# Patient Record
Sex: MALE | Race: WHITE | NOT HISPANIC OR LATINO | Employment: FULL TIME | URBAN - METROPOLITAN AREA
[De-identification: names, ages, dates, MRNs, and addresses within clinical notes are randomized per-mention and may not be internally consistent; named-entity substitution may affect disease eponyms.]

---

## 2019-08-15 LAB
ALBUMIN SERPL-MCNC: 4 GM/DL (ref 3.4–5)
ALBUMIN/GLOB SERPL: 1.3 RATIO (ref 1.1–2)
ALP SERPL-CCNC: 120 UNIT/L (ref 50–136)
ALT SERPL-CCNC: 36 UNIT/L (ref 12–78)
AST SERPL-CCNC: 18 UNIT/L (ref 15–37)
BILIRUB SERPL-MCNC: 0.8 MG/DL (ref 0.2–1)
BILIRUBIN DIRECT+TOT PNL SERPL-MCNC: 0.1 MG/DL (ref 0–0.5)
BILIRUBIN DIRECT+TOT PNL SERPL-MCNC: 0.7 MG/DL (ref 0–0.8)
BUN SERPL-MCNC: 14 MG/DL (ref 7–18)
CALCIUM SERPL-MCNC: 9.4 MG/DL (ref 8.5–10.1)
CHLORIDE SERPL-SCNC: 106 MMOL/L (ref 98–107)
CO2 SERPL-SCNC: 31 MMOL/L (ref 21–32)
CREAT SERPL-MCNC: 1.03 MG/DL (ref 0.7–1.3)
ERYTHROCYTE [DISTWIDTH] IN BLOOD BY AUTOMATED COUNT: 12.5 % (ref 11.5–17)
GLOBULIN SER-MCNC: 3.1 GM/DL (ref 2.4–3.5)
GLUCOSE SERPL-MCNC: 90 MG/DL (ref 74–106)
HCT VFR BLD AUTO: 49.5 % (ref 42–52)
HGB BLD-MCNC: 16.4 GM/DL (ref 14–18)
MCH RBC QN AUTO: 30.1 PG (ref 27–31)
MCHC RBC AUTO-ENTMCNC: 33.1 GM/DL (ref 33–36)
MCV RBC AUTO: 90.8 FL (ref 80–94)
PLATELET # BLD AUTO: 198 X10(3)/MCL (ref 130–400)
PMV BLD AUTO: 9.4 FL (ref 9.4–12.4)
POTASSIUM SERPL-SCNC: 4.5 MMOL/L (ref 3.5–5.1)
PROT SERPL-MCNC: 7.1 GM/DL (ref 6.4–8.2)
RBC # BLD AUTO: 5.45 X10(6)/MCL (ref 4.7–6.1)
SODIUM SERPL-SCNC: 142 MMOL/L (ref 136–145)
WBC # SPEC AUTO: 5.4 X10(3)/MCL (ref 4.5–11.5)

## 2019-09-03 ENCOUNTER — HOSPITAL ENCOUNTER (OUTPATIENT)
Dept: MEDSURG UNIT | Facility: HOSPITAL | Age: 59
End: 2019-09-05
Attending: SPECIALIST | Admitting: SPECIALIST

## 2019-09-03 LAB
ABS NEUT (OLG): 8.02 X10(3)/MCL (ref 2.1–9.2)
ALBUMIN SERPL-MCNC: 3.2 GM/DL (ref 3.4–5)
ALBUMIN/GLOB SERPL: 1.5 RATIO (ref 1.1–2)
ALP SERPL-CCNC: 72 UNIT/L (ref 50–136)
ALT SERPL-CCNC: 24 UNIT/L (ref 12–78)
AST SERPL-CCNC: 21 UNIT/L (ref 15–37)
BASOPHILS # BLD AUTO: 0 X10(3)/MCL (ref 0–0.2)
BASOPHILS NFR BLD AUTO: 0 %
BILIRUB SERPL-MCNC: 0.8 MG/DL (ref 0.2–1)
BILIRUBIN DIRECT+TOT PNL SERPL-MCNC: 0.2 MG/DL (ref 0–0.5)
BILIRUBIN DIRECT+TOT PNL SERPL-MCNC: 0.6 MG/DL (ref 0–0.8)
BUN SERPL-MCNC: 11 MG/DL (ref 7–18)
CALCIUM SERPL-MCNC: 7 MG/DL (ref 8.5–10.1)
CHLORIDE SERPL-SCNC: 109 MMOL/L (ref 98–107)
CO2 SERPL-SCNC: 24 MMOL/L (ref 21–32)
CREAT SERPL-MCNC: 0.96 MG/DL (ref 0.7–1.3)
ERYTHROCYTE [DISTWIDTH] IN BLOOD BY AUTOMATED COUNT: 12.4 % (ref 11.5–17)
GLOBULIN SER-MCNC: 2.2 GM/DL (ref 2.4–3.5)
GLUCOSE SERPL-MCNC: 161 MG/DL (ref 74–106)
GROUP & RH: NORMAL
HCT VFR BLD AUTO: 39.7 % (ref 42–52)
HGB BLD-MCNC: 13.2 GM/DL (ref 14–18)
LYMPHOCYTES # BLD AUTO: 1.2 X10(3)/MCL (ref 0.6–4.6)
LYMPHOCYTES NFR BLD AUTO: 12 %
MCH RBC QN AUTO: 30.7 PG (ref 27–31)
MCHC RBC AUTO-ENTMCNC: 33.2 GM/DL (ref 33–36)
MCV RBC AUTO: 92.3 FL (ref 80–94)
MONOCYTES # BLD AUTO: 0.2 X10(3)/MCL (ref 0.1–1.3)
MONOCYTES NFR BLD AUTO: 2 %
NEUTROPHILS # BLD AUTO: 8.02 X10(3)/MCL (ref 2.1–9.2)
NEUTROPHILS NFR BLD AUTO: 84 %
PLATELET # BLD AUTO: 146 X10(3)/MCL (ref 130–400)
PMV BLD AUTO: 9.5 FL (ref 9.4–12.4)
POTASSIUM SERPL-SCNC: 3.7 MMOL/L (ref 3.5–5.1)
PROT SERPL-MCNC: 5.4 GM/DL (ref 6.4–8.2)
RBC # BLD AUTO: 4.3 X10(6)/MCL (ref 4.7–6.1)
SODIUM SERPL-SCNC: 142 MMOL/L (ref 136–145)
WBC # SPEC AUTO: 9.5 X10(3)/MCL (ref 4.5–11.5)

## 2019-09-04 LAB
ABS NEUT (OLG): 7.22 X10(3)/MCL (ref 2.1–9.2)
ALBUMIN SERPL-MCNC: 3.5 GM/DL (ref 3.4–5)
ALBUMIN/GLOB SERPL: 1.7 {RATIO}
ALP SERPL-CCNC: 78 UNIT/L (ref 50–136)
ALT SERPL-CCNC: 35 UNIT/L (ref 12–78)
AST SERPL-CCNC: 19 UNIT/L (ref 15–37)
BASOPHILS # BLD AUTO: 0 X10(3)/MCL (ref 0–0.2)
BASOPHILS NFR BLD AUTO: 0 %
BILIRUB SERPL-MCNC: 0.8 MG/DL (ref 0.2–1)
BILIRUBIN DIRECT+TOT PNL SERPL-MCNC: 0.2 MG/DL (ref 0–0.2)
BILIRUBIN DIRECT+TOT PNL SERPL-MCNC: 0.6 MG/DL (ref 0–0.8)
BUN SERPL-MCNC: 11 MG/DL (ref 7–18)
CALCIUM SERPL-MCNC: 8.6 MG/DL (ref 8.5–10.1)
CHLORIDE SERPL-SCNC: 106 MMOL/L (ref 98–107)
CO2 SERPL-SCNC: 29 MMOL/L (ref 21–32)
CREAT FLD-MCNC: 0.8 MG/DL
CREAT SERPL-MCNC: 1.17 MG/DL (ref 0.7–1.3)
EOSINOPHIL # BLD AUTO: 0 X10(3)/MCL (ref 0–0.9)
EOSINOPHIL NFR BLD AUTO: 0 %
ERYTHROCYTE [DISTWIDTH] IN BLOOD BY AUTOMATED COUNT: 12.7 % (ref 11.5–17)
GLOBULIN SER-MCNC: 2.1 GM/DL (ref 2.4–3.5)
GLUCOSE SERPL-MCNC: 103 MG/DL (ref 74–106)
HCT VFR BLD AUTO: 42.6 % (ref 42–52)
HGB BLD-MCNC: 14 GM/DL (ref 14–18)
LYMPHOCYTES # BLD AUTO: 2 X10(3)/MCL (ref 0.6–4.6)
LYMPHOCYTES NFR BLD AUTO: 20 %
MCH RBC QN AUTO: 30.6 PG (ref 27–31)
MCHC RBC AUTO-ENTMCNC: 32.9 GM/DL (ref 33–36)
MCV RBC AUTO: 93.2 FL (ref 80–94)
MONOCYTES # BLD AUTO: 0.4 X10(3)/MCL (ref 0.1–1.3)
MONOCYTES NFR BLD AUTO: 4 %
NEUTROPHILS # BLD AUTO: 7.22 X10(3)/MCL (ref 2.1–9.2)
NEUTROPHILS NFR BLD AUTO: 74 %
PLATELET # BLD AUTO: 150 X10(3)/MCL (ref 130–400)
PMV BLD AUTO: 9.7 FL (ref 9.4–12.4)
POTASSIUM SERPL-SCNC: 3.9 MMOL/L (ref 3.5–5.1)
PROT SERPL-MCNC: 5.6 GM/DL (ref 6.4–8.2)
RBC # BLD AUTO: 4.57 X10(6)/MCL (ref 4.7–6.1)
SODIUM SERPL-SCNC: 140 MMOL/L (ref 136–145)
WBC # SPEC AUTO: 9.7 X10(3)/MCL (ref 4.5–11.5)

## 2022-04-30 NOTE — OP NOTE
DATE OF SURGERY:        SURGEON:  Owen Vergara MD  ASSISTANT:  Henrry Blake MD    PREOPERATIVE DIAGNOSIS:  Adenocarcinoma of the prostate.    POSTOPERATIVE DIAGNOSIS:  Adenocarcinoma of the prostate.    PROCEDURE:  Robot-assisted radical prostatectomy with bilateral pelvic lymph node dissection.    OPERATION IN DETAIL:  After preop consent, the patient was taken to the procedure room and placed in the supine position.  The patient had a prior open cholecystectomy.  We, therefore, placed a Veress needle lateral to the umbilicus and insufflated the abdomen with that.  Once this was done, an incision was made and a laparoscopic port was placed.  Under vision, all ports were placed.  We were pleased to see minimal adhesions around the site of the umbilicus and working ports.  Working ports were placed on the right and left (2 on the right, 3 on the left).  A visual port was placed above the umbilicus.  The robot was docked.  The patient was placed in steep Trendelenburg.  The bowel was packed away using the robot.  An incision was made above the seminal vesicles and vas deferens.  Dissection was carried forth on the right and left sides, fully mobilizing the right vas deferens and the right seminal vesicles.  Holding them on traction, we were able to mobilize the contralateral side.  Once this was done, the plane between the rectum and prostate was developed using a combination of sharp dissection with Bovie Endo cautery.  Once this was done to satisfaction, the umbilical ligaments were identified, and incised on the right and left.  The bladder was taken down from the top toward the pelvis, once the space of Retzius was entered.  This was performed sharply with Bovie Endo cautery for hemostasis.  At the deep pelvis, we were able to mobilize the periprosthetic fat and visualize the endopelvic fascia on the right and left sides.  With the bladder on traction, we opened the right endopelvic fascia, then  the left and dissected toward the apex of the prostate on both sides.  Once this was done, we noted some bleeding near the dorsal vein.  This was oversewn using a 3-0 Vicryl.  Bleeding ceased once this was placed.  An endovascular stapling device was used to incise the dorsal vein.  This allowed us to fully visualize the urethra.  Hemostasis was perfect.  We then held the bladder on traction, opened the anterior and posterior leaflets of the bladder.  Once the posterior leaflet of the bladder was incised, we dissected toward Denonvilliers fascia, identified seminal vesicles and vas deferens and, with effort, pulled them through the posterior leaflet of the bladder.  While on traction, we mobilized the prostatic vessels on the right and left toward the apex (using the EnSeal device).  Once this was done, combination sharp dissection with Bovie Endo cautery was used to fully mobilize the prostate and remove it.  After this portion of the procedure, we performed our laparoscopic node dissection.  This was performed on the right and then left sides with great care to avoid the obturator nerve and femoral vessels.  This was done without incident.  The right piotr packet was removed, then the left piotr packet was removed, as the procedure was performed similarly.  Once this was done, we performed a Mirza stitch to approximate the bladder and urethra.  Then, we performed an urethrovesical anastomosis with double-armed stitch, starting posteriorly, then finishing anteriorly, and tying.  A catheter was inserted.  We checked the anastomosis for water tightness and were pleased to see no extravasation of fluid.  The periprosthetic fat, lymphatic tissue, and prostate were then bagged.  AmnioFix was placed around the nerves on the right and the left sides.  Hemostatic improving gel was placed to assist.  The prostate was removed through the visual port.  A drain was placed in the most dependent portion of the pelvis.  Skin  edges were closed.  A good cosmetic result was noted.  The patient tolerated the procedure favorably and was transferred from the procedure room to recovery in stable condition.        ______________________________  Owen Vergara MD    SHS/SZ  DD:  09/03/2019  Time:  12:17PM  DT:  09/03/2019  Time:  12:38PM  Job #:  [id number]

## 2022-04-30 NOTE — DISCHARGE SUMMARY
DISCHARGE DATE:  09/03/2019    HISTORY OF PRESENT ILLNESS:  The patient is a 58-year-old gentleman with newly diagnosed Rachael 7 prostate cancer.  He had high volume disease, high PSA (7 cores present.  Gland measured 35.5 g).  The patient had all options discussed in great detail, seen multiple consultants and opted to have a radical prostatectomy with bilateral pelvic lymph node dissection (robotic-assist) with the knowledge that he may require radiation and hormonal therapy afterwards.  Risks and benefits were clearly outlined.  The patient understood and wished to proceed.    PAST MEDICAL HISTORY:  Prostate cancer, elevated PSA.    SURGICAL HISTORY:  Cholecystectomy, tonsillectomy.    PHYSICAL EXAM:  VITAL SIGNS:  Afebrile.  Vital signs stable.   GENERAL:  Comfortable, no acute distress.     ABDOMEN:  Soft, nontender and nondistended.  Flanks nontender.   GENITALIA:  Deferred.    HOSPITAL COURSE:  The patient was taken to the procedure room on 09/03/2019.  Uneventful robot-assisted radical prostatectomy was performed.  The patient tolerated the procedure favorably and was transferred from the procedure room to Recovery in stable condition.  On postoperative day 1, patient was ambulatory, had bowel sounds.  His diet was started.  He had a fair amount of fluid from the QUINTIN as the day progressed.  We therefore opted to maintain hospitalized status and send his QUINTIN fluid for creatinine.  We were pleased to see that the fluid was consistent with serum, and therefore on postop day 2, since he continued to do well, looked and felt great and was eager for discharge, we opted to send him home after leg bag training.  He will follow up in our office in approximately 10 days for a voiding trial.  He was given     a prescription for Percocet and Colace as well as antibiotics and advised to call with any problems in the interim.      ______________________________  Owen Vergara MD    [facility]  DD:  09/05/2019   Time:  06:58AM  DT:  09/05/2019  Time:  07:10AM  Job #:  [id number]

## 2023-09-14 DIAGNOSIS — D69.6 THROMBOCYTOPENIC DISORDER: Primary | ICD-10-CM

## 2023-09-25 NOTE — PROGRESS NOTES
Subjective:        PATIENT: Jose Sarmiento  MRN: [id number]  DATE: 9/26/2023  Chief Complaint: Follow-up (New patient visit for thrombocytopenic disorder)        09/26/2023  Dr. Chun Collins  Reason for referral thrombocytopenia: Referred on 09/05/2023    This is a 62-year-old male who his followed by [facility] Dr. Mcgrath for prostate carcinoma.  His prostate surgery was not September 3rd night 2019.  He  had radiation therapy [facility] 10/15/2022   His PSA was 42 it [place] 7.  A T2 C in 7 cores,    He was seen by his primary care had his normal health examination, he is also noted to have a left inguinal hernia pertinent laboratory review was noted to have a white count 3.6 hematocrit of 43 and a platelet count of 110.    Previous CBC showed a platelet count of 115 several months ago and in June of 2022 was normal at 149        He reports his newest medication is his cholesterol medicine.  Denies any history of hepatitis yellow jaundice tuberculosis.  He did have a tetanus shot and a flu shot.  And he is also had a upper respiratory illness treated on some steroids and antibiotics with a sore throat over the last 6-7 weeks.    This is recovering.    Denies any night sweats or weight loss no lymphadenopathy.    He does have some fatigue.  He denies any change in bruising or bleeding.    He denies any supplement use.  He does have a hernia however he is also trying to take care of his dad who is in terminal situation.          Past Medical History:   Diagnosis Date    HLD (hyperlipidemia)     Prostate cancer       .  Past Surgical History:   Procedure Laterality Date    CHOLECYSTECTOMY      COLONOSCOPY  08/20/2019    LAPAROSCOPY      PROSTATE BIOPSY      PROSTATECTOMY      TONSILLECTOMY        .  Family History   Problem Relation Age of Onset    Hypertension Father     Hyperlipidemia Father     Hypertension Sister     Cancer Brother         Prostate    Hypertension Brother       Social History     Socioeconomic  History    Marital status:    Tobacco Use    Smoking status: Never    Smokeless tobacco: Never   Substance and Sexual Activity    Alcohol use: Yes     Comment: Social drinker      .Review of patient's allergies indicates:  No Known Allergies     Current Outpatient Medications:     cholecalciferol, vitamin D3, 125 mcg (5,000 unit) Tab, Take 1 tablet by mouth once daily., Disp: , Rfl:     cyanocobalamin (VITAMIN B-12) 1000 MCG tablet, Take 100 mcg by mouth once daily., Disp: , Rfl:     doxycycline (VIBRAMYCIN) 100 MG Cap, Take 100 mg by mouth 2 (two) times daily., Disp: , Rfl:     folic acid/multivit-min/lutein (CENTRUM SILVER ORAL), Take by mouth., Disp: , Rfl:     predniSONE (DELTASONE) 20 MG tablet, Take 20 mg by mouth 2 (two) times daily., Disp: , Rfl:     promethazine-dextromethorphan (PROMETHAZINE-DM) 6.25-15 mg/5 mL Syrp, Take 5 mLs by mouth every 6 (six) hours as needed., Disp: , Rfl:     rosuvastatin (CRESTOR) 20 MG tablet, Take 20 mg by mouth., Disp: , Rfl:    Review of Systems     See HPI  Objective:     Vitals:    09/26/23 1329   BP: 96/65   Pulse: 74   Temp: 97.2 °F (36.2 °C)         Physical Exam  Vitals reviewed.   Constitutional:       Appearance: Normal appearance.   HENT:      Head: Normocephalic and atraumatic.      Mouth/Throat:      Mouth: Mucous membranes are moist.      Pharynx: Oropharynx is clear.   Eyes:      Extraocular Movements: Extraocular movements intact.      Conjunctiva/sclera: Conjunctivae normal.      Pupils: Pupils are equal, round, and reactive to light.   Cardiovascular:      Rate and Rhythm: Normal rate and regular rhythm.      Pulses: Normal pulses.      Heart sounds: Normal heart sounds.   Pulmonary:      Effort: Pulmonary effort is normal.      Breath sounds: Normal breath sounds.   Abdominal:      General: Abdomen is flat. Bowel sounds are normal. There is no distension.      Palpations: Abdomen is soft.   Musculoskeletal:         General: Normal range of motion.       Cervical back: Normal range of motion and neck supple.   Skin:     General: Skin is warm and dry.   Neurological:      General: No focal deficit present.      Mental Status: He is alert and oriented to person, place, and time. Mental status is at baseline.   Psychiatric:         Attention and Perception: Attention normal.         Mood and Affect: Mood normal.         Speech: Speech normal.         Behavior: Behavior normal.         Thought Content: Thought content normal.         Judgment: Judgment normal.       ECOG SCORE            .Lab Review:  Old records from primary care reviewed  Assessment/Plan:   History of prostate carcinoma   Mild thrombocytopenia       This is a 62-year-old male with a history of prostate carcinoma and radiation therapy with mild thrombocytopenia.    He also has a history of recent URI currently on antibiotics.    We explained the differential diagnosis of thrombocytopenia including but not limited to : autoimmune, drug related, viral mediated, and underlying myelodysplasia or mds  related given his history.    He is no significant signs of sequelae related to his low platelets.    His platelets are greater than 100,000 on last check.    We discussed his workup today and labs and potential follow-up and bone marrow biopsies if no diagnosis or progressive.  Labs today:      Orders Placed This Encounter    APTT    Fibrinogen    Lactate Dehydrogenase    Platelet Autoantibody, Cell Bound    Reticulocytes    CBC Auto Differential    Comprehensive Metabolic Panel    Sedimentation Rate    C-Reactive Protein    Vitamin B12    Folate    Path Review, Peripheral Smear    MMA    CBC Auto Differential         Follow up in about 6 weeks (around 11/7/2023) for with same day cbc.     Alberto Finney MD

## 2023-09-26 ENCOUNTER — OFFICE VISIT (OUTPATIENT)
Dept: HEMATOLOGY/ONCOLOGY | Facility: CLINIC | Age: 63
End: 2023-09-26
Payer: COMMERCIAL

## 2023-09-26 VITALS
HEIGHT: 71 IN | TEMPERATURE: 97 F | SYSTOLIC BLOOD PRESSURE: 96 MMHG | OXYGEN SATURATION: 97 % | DIASTOLIC BLOOD PRESSURE: 65 MMHG | HEART RATE: 74 BPM | WEIGHT: 179.44 LBS | BODY MASS INDEX: 25.12 KG/M2

## 2023-09-26 DIAGNOSIS — D69.6 THROMBOCYTOPENIC DISORDER: ICD-10-CM

## 2023-09-26 LAB
ALBUMIN SERPL-MCNC: 3.8 G/DL (ref 3.4–4.8)
ALBUMIN/GLOB SERPL: 1.7 RATIO (ref 1.1–2)
ALP SERPL-CCNC: 75 UNIT/L (ref 40–150)
ALT SERPL-CCNC: 61 UNIT/L (ref 0–55)
APTT PPP: 25.2 SECONDS (ref 23.2–33.7)
AST SERPL-CCNC: 25 UNIT/L (ref 5–34)
BILIRUB SERPL-MCNC: 1 MG/DL
BUN SERPL-MCNC: 20.2 MG/DL (ref 8.4–25.7)
CALCIUM SERPL-MCNC: 9.2 MG/DL (ref 8.8–10)
CHLORIDE SERPL-SCNC: 106 MMOL/L (ref 98–107)
CO2 SERPL-SCNC: 29 MMOL/L (ref 23–31)
CREAT SERPL-MCNC: 1.01 MG/DL (ref 0.73–1.18)
CRP SERPL-MCNC: <1 MG/L
ERYTHROCYTE [SEDIMENTATION RATE] IN BLOOD: <1 MM/HR (ref 0–15)
FIBRINOGEN PPP-MCNC: 288 MG/DL (ref 210–463)
FOLATE SERPL-MCNC: 14.2 NG/ML (ref 7–31.4)
GFR SERPLBLD CREATININE-BSD FMLA CKD-EPI: >60 MLS/MIN/1.73/M2
GLOBULIN SER-MCNC: 2.3 GM/DL (ref 2.4–3.5)
GLUCOSE SERPL-MCNC: 88 MG/DL (ref 82–115)
LDH SERPL-CCNC: 154 U/L (ref 125–220)
POTASSIUM SERPL-SCNC: 4.2 MMOL/L (ref 3.5–5.1)
PROT SERPL-MCNC: 6.1 GM/DL (ref 5.8–7.6)
RET# (OHS): 0.07 X10E6/UL (ref 0.03–0.1)
RETICULOCYTE COUNT AUTOMATED (OLG): 1.27 % (ref 1.1–2.1)
SODIUM SERPL-SCNC: 143 MMOL/L (ref 136–145)
VIT B12 SERPL-MCNC: 852 PG/ML (ref 213–816)

## 2023-09-26 PROCEDURE — 85025 COMPLETE CBC W/AUTO DIFF WBC: CPT | Performed by: INTERNAL MEDICINE

## 2023-09-26 PROCEDURE — 82746 ASSAY OF FOLIC ACID SERUM: CPT | Performed by: INTERNAL MEDICINE

## 2023-09-26 PROCEDURE — 99999 PR PBB SHADOW E&M-EST. PATIENT-LVL IV: ICD-10-PCS | Mod: PBBFAC,,, | Performed by: INTERNAL MEDICINE

## 2023-09-26 PROCEDURE — 99204 PR OFFICE/OUTPT VISIT, NEW, LEVL IV, 45-59 MIN: ICD-10-PCS | Mod: S$GLB,,, | Performed by: INTERNAL MEDICINE

## 2023-09-26 PROCEDURE — 3074F PR MOST RECENT SYSTOLIC BLOOD PRESSURE < 130 MM HG: ICD-10-PCS | Mod: CPTII,S$GLB,, | Performed by: INTERNAL MEDICINE

## 2023-09-26 PROCEDURE — 85045 AUTOMATED RETICULOCYTE COUNT: CPT | Performed by: INTERNAL MEDICINE

## 2023-09-26 PROCEDURE — 82607 VITAMIN B-12: CPT | Performed by: INTERNAL MEDICINE

## 2023-09-26 PROCEDURE — 85384 FIBRINOGEN ACTIVITY: CPT | Performed by: INTERNAL MEDICINE

## 2023-09-26 PROCEDURE — 3078F DIAST BP <80 MM HG: CPT | Mod: CPTII,S$GLB,, | Performed by: INTERNAL MEDICINE

## 2023-09-26 PROCEDURE — 3008F PR BODY MASS INDEX (BMI) DOCUMENTED: ICD-10-PCS | Mod: CPTII,S$GLB,, | Performed by: INTERNAL MEDICINE

## 2023-09-26 PROCEDURE — 1159F MED LIST DOCD IN RCRD: CPT | Mod: CPTII,S$GLB,, | Performed by: INTERNAL MEDICINE

## 2023-09-26 PROCEDURE — 3008F BODY MASS INDEX DOCD: CPT | Mod: CPTII,S$GLB,, | Performed by: INTERNAL MEDICINE

## 2023-09-26 PROCEDURE — 1160F PR REVIEW ALL MEDS BY PRESCRIBER/CLIN PHARMACIST DOCUMENTED: ICD-10-PCS | Mod: CPTII,S$GLB,, | Performed by: INTERNAL MEDICINE

## 2023-09-26 PROCEDURE — 86023 IMMUNOGLOBULIN ASSAY: CPT | Performed by: INTERNAL MEDICINE

## 2023-09-26 PROCEDURE — 85652 RBC SED RATE AUTOMATED: CPT | Performed by: INTERNAL MEDICINE

## 2023-09-26 PROCEDURE — 80053 COMPREHEN METABOLIC PANEL: CPT | Performed by: INTERNAL MEDICINE

## 2023-09-26 PROCEDURE — 85730 THROMBOPLASTIN TIME PARTIAL: CPT | Performed by: INTERNAL MEDICINE

## 2023-09-26 PROCEDURE — 83615 LACTATE (LD) (LDH) ENZYME: CPT | Performed by: INTERNAL MEDICINE

## 2023-09-26 PROCEDURE — 99999 PR PBB SHADOW E&M-EST. PATIENT-LVL IV: CPT | Mod: PBBFAC,,, | Performed by: INTERNAL MEDICINE

## 2023-09-26 PROCEDURE — 99204 OFFICE O/P NEW MOD 45 MIN: CPT | Mod: S$GLB,,, | Performed by: INTERNAL MEDICINE

## 2023-09-26 PROCEDURE — 83921 ORGANIC ACID SINGLE QUANT: CPT | Performed by: INTERNAL MEDICINE

## 2023-09-26 PROCEDURE — 1160F RVW MEDS BY RX/DR IN RCRD: CPT | Mod: CPTII,S$GLB,, | Performed by: INTERNAL MEDICINE

## 2023-09-26 PROCEDURE — 36415 COLL VENOUS BLD VENIPUNCTURE: CPT | Performed by: INTERNAL MEDICINE

## 2023-09-26 PROCEDURE — 3078F PR MOST RECENT DIASTOLIC BLOOD PRESSURE < 80 MM HG: ICD-10-PCS | Mod: CPTII,S$GLB,, | Performed by: INTERNAL MEDICINE

## 2023-09-26 PROCEDURE — 3074F SYST BP LT 130 MM HG: CPT | Mod: CPTII,S$GLB,, | Performed by: INTERNAL MEDICINE

## 2023-09-26 PROCEDURE — 86140 C-REACTIVE PROTEIN: CPT | Performed by: INTERNAL MEDICINE

## 2023-09-26 PROCEDURE — 1159F PR MEDICATION LIST DOCUMENTED IN MEDICAL RECORD: ICD-10-PCS | Mod: CPTII,S$GLB,, | Performed by: INTERNAL MEDICINE

## 2023-09-26 RX ORDER — ROSUVASTATIN CALCIUM 20 MG/1
20 TABLET, COATED ORAL
COMMUNITY
Start: 2023-04-05

## 2023-09-26 RX ORDER — DOXYCYCLINE 100 MG/1
100 CAPSULE ORAL 2 TIMES DAILY
COMMUNITY
Start: 2023-09-15 | End: 2023-11-06

## 2023-09-26 RX ORDER — LANOLIN ALCOHOL/MO/W.PET/CERES
100 CREAM (GRAM) TOPICAL DAILY
COMMUNITY

## 2023-09-26 RX ORDER — PREDNISONE 20 MG/1
20 TABLET ORAL 2 TIMES DAILY
COMMUNITY
Start: 2023-09-15 | End: 2023-11-06

## 2023-09-26 RX ORDER — ACETAMINOPHEN 500 MG
1 TABLET ORAL DAILY
COMMUNITY

## 2023-09-26 RX ORDER — PROMETHAZINE HYDROCHLORIDE AND DEXTROMETHORPHAN HYDROBROMIDE 6.25; 15 MG/5ML; MG/5ML
5 SYRUP ORAL EVERY 6 HOURS PRN
COMMUNITY
Start: 2023-09-15 | End: 2023-11-06

## 2023-09-26 RX ORDER — AZITHROMYCIN 250 MG/1
TABLET, FILM COATED ORAL
COMMUNITY
Start: 2023-09-06 | End: 2023-09-26

## 2023-09-26 RX ORDER — BENZONATATE 200 MG/1
200 CAPSULE ORAL
COMMUNITY
Start: 2023-09-06 | End: 2023-09-26

## 2023-09-27 DIAGNOSIS — D69.6 THROMBOCYTOPENIC DISORDER: Primary | ICD-10-CM

## 2023-09-27 LAB
BASOPHILS # BLD AUTO: 0.01 X10(3)/MCL
BASOPHILS NFR BLD AUTO: 0.2 %
EOSINOPHIL # BLD AUTO: 0.04 X10(3)/MCL (ref 0–0.9)
EOSINOPHIL NFR BLD AUTO: 0.8 %
ERYTHROCYTE [DISTWIDTH] IN BLOOD BY AUTOMATED COUNT: 12.4 % (ref 11.5–17)
HCT VFR BLD AUTO: 50.8 % (ref 42–52)
HEMATOLOGIST REVIEW: NORMAL
HGB BLD-MCNC: 16.2 G/DL (ref 14–18)
IMM GRANULOCYTES # BLD AUTO: 0.25 X10(3)/MCL (ref 0–0.04)
IMM GRANULOCYTES NFR BLD AUTO: 4.9 %
LYMPHOCYTES # BLD AUTO: 1.7 X10(3)/MCL (ref 0.6–4.6)
LYMPHOCYTES NFR BLD AUTO: 33.5 %
MCH RBC QN AUTO: 30.9 PG (ref 27–31)
MCHC RBC AUTO-ENTMCNC: 31.9 G/DL (ref 33–36)
MCV RBC AUTO: 96.9 FL (ref 80–94)
MONOCYTES # BLD AUTO: 0.29 X10(3)/MCL (ref 0.1–1.3)
MONOCYTES NFR BLD AUTO: 5.7 %
NEUTROPHILS # BLD AUTO: 2.78 X10(3)/MCL (ref 2.1–9.2)
NEUTROPHILS NFR BLD AUTO: 54.9 %
PLATELET # BLD AUTO: 99 X10(3)/MCL (ref 130–400)
PLATELET # BLD EST: ADEQUATE 10*3/UL
PMV BLD AUTO: 9.4 FL (ref 7.4–10.4)
RBC # BLD AUTO: 5.24 X10(6)/MCL (ref 4.7–6.1)
RBC MORPH BLD: NORMAL
WBC # SPEC AUTO: 5.07 X10(3)/MCL (ref 4.5–11.5)

## 2023-09-29 LAB — METHYLMALONATE SERPL-SCNC: 0.09 NMOL/ML

## 2023-10-02 LAB — BEAKER SEE SCANNED REPORT: NORMAL

## 2023-10-04 ENCOUNTER — LAB VISIT (OUTPATIENT)
Dept: LAB | Facility: HOSPITAL | Age: 63
End: 2023-10-04
Attending: INTERNAL MEDICINE
Payer: COMMERCIAL

## 2023-10-04 DIAGNOSIS — D69.6 THROMBOCYTOPENIC DISORDER: ICD-10-CM

## 2023-10-04 LAB
BASOPHILS # BLD AUTO: 0.01 X10(3)/MCL
BASOPHILS NFR BLD AUTO: 0.2 %
EOSINOPHIL # BLD AUTO: 0.06 X10(3)/MCL (ref 0–0.9)
EOSINOPHIL NFR BLD AUTO: 1.4 %
ERYTHROCYTE [DISTWIDTH] IN BLOOD BY AUTOMATED COUNT: 12.1 % (ref 11.5–17)
HCT VFR BLD AUTO: 48.1 % (ref 42–52)
HGB BLD-MCNC: 15.6 G/DL (ref 14–18)
IMM GRANULOCYTES # BLD AUTO: 0.05 X10(3)/MCL (ref 0–0.04)
IMM GRANULOCYTES NFR BLD AUTO: 1.1 %
LYMPHOCYTES # BLD AUTO: 1.28 X10(3)/MCL (ref 0.6–4.6)
LYMPHOCYTES NFR BLD AUTO: 29.1 %
MCH RBC QN AUTO: 31.2 PG (ref 27–31)
MCHC RBC AUTO-ENTMCNC: 32.4 G/DL (ref 33–36)
MCV RBC AUTO: 96.2 FL (ref 80–94)
MONOCYTES # BLD AUTO: 0.35 X10(3)/MCL (ref 0.1–1.3)
MONOCYTES NFR BLD AUTO: 8 %
NEUTROPHILS # BLD AUTO: 2.65 X10(3)/MCL (ref 2.1–9.2)
NEUTROPHILS NFR BLD AUTO: 60.2 %
PLATELET # BLD AUTO: 82 X10(3)/MCL (ref 130–400)
PMV BLD AUTO: 9.3 FL (ref 7.4–10.4)
RBC # BLD AUTO: 5 X10(6)/MCL (ref 4.7–6.1)
WBC # SPEC AUTO: 4.4 X10(3)/MCL (ref 4.5–11.5)

## 2023-10-04 PROCEDURE — 85025 COMPLETE CBC W/AUTO DIFF WBC: CPT

## 2023-10-04 PROCEDURE — 36415 COLL VENOUS BLD VENIPUNCTURE: CPT

## 2023-10-06 ENCOUNTER — LAB VISIT (OUTPATIENT)
Dept: LAB | Facility: HOSPITAL | Age: 63
End: 2023-10-06
Attending: INTERNAL MEDICINE
Payer: COMMERCIAL

## 2023-10-06 DIAGNOSIS — D69.6 THROMBOCYTOPENIC DISORDER: ICD-10-CM

## 2023-10-06 LAB
BASOPHILS # BLD AUTO: 0.01 X10(3)/MCL
BASOPHILS NFR BLD AUTO: 0.2 %
EOSINOPHIL # BLD AUTO: 0.07 X10(3)/MCL (ref 0–0.9)
EOSINOPHIL NFR BLD AUTO: 1.7 %
ERYTHROCYTE [DISTWIDTH] IN BLOOD BY AUTOMATED COUNT: 12.3 % (ref 11.5–17)
HCT VFR BLD AUTO: 46.5 % (ref 42–52)
HGB BLD-MCNC: 14.9 G/DL (ref 14–18)
IMM GRANULOCYTES # BLD AUTO: 0.04 X10(3)/MCL (ref 0–0.04)
IMM GRANULOCYTES NFR BLD AUTO: 1 %
LYMPHOCYTES # BLD AUTO: 1.37 X10(3)/MCL (ref 0.6–4.6)
LYMPHOCYTES NFR BLD AUTO: 33.3 %
MCH RBC QN AUTO: 31.3 PG (ref 27–31)
MCHC RBC AUTO-ENTMCNC: 32 G/DL (ref 33–36)
MCV RBC AUTO: 97.7 FL (ref 80–94)
MONOCYTES # BLD AUTO: 0.32 X10(3)/MCL (ref 0.1–1.3)
MONOCYTES NFR BLD AUTO: 7.8 %
NEUTROPHILS # BLD AUTO: 2.31 X10(3)/MCL (ref 2.1–9.2)
NEUTROPHILS NFR BLD AUTO: 56 %
PLATELET # BLD AUTO: 88 X10(3)/MCL (ref 130–400)
PMV BLD AUTO: 9.8 FL (ref 7.4–10.4)
RBC # BLD AUTO: 4.76 X10(6)/MCL (ref 4.7–6.1)
WBC # SPEC AUTO: 4.12 X10(3)/MCL (ref 4.5–11.5)

## 2023-10-06 PROCEDURE — 82668 ASSAY OF ERYTHROPOIETIN: CPT

## 2023-10-06 PROCEDURE — 81270 JAK2 GENE: CPT

## 2023-10-06 PROCEDURE — 85025 COMPLETE CBC W/AUTO DIFF WBC: CPT

## 2023-10-06 PROCEDURE — 36415 COLL VENOUS BLD VENIPUNCTURE: CPT

## 2023-10-07 LAB — EPO SERPL-ACNC: 12.6 MIU/ML (ref 2.6–18.5)

## 2023-10-10 LAB
JAK2 P.V617F BLD/T QL: NORMAL
JAK2 P.V617F MUT/NOR BLD/T: NORMAL %

## 2023-11-02 NOTE — PROGRESS NOTES
Subjective:        PATIENT: Jose Sarmiento  MRN: [id number]  DATE: 11/6/2023  Chief Complaint: Follow-up (Patient is here for his 6 week visit, questions about lab results)        11/06/2023  Dr. Chun Collins  Reason for referral thrombocytopenia: Referred on 09/05/2023    This is a 62-year-old male who his followed by [facility] Dr. Mcgrath for prostate carcinoma.  His prostate surgery was not September 3rd night 2019.  He  had radiation therapy [facility] 10/15/2022   His PSA was 42 it [place] 7.  A T2 C in 7 cores,    He was seen by his primary care had his normal health examination, he is also noted to have a left inguinal hernia pertinent laboratory review was noted to have a white count 3.6 hematocrit of 43 and a platelet count of 110.    Previous CBC showed a platelet count of 115 several months ago and in June of 2022 was normal at 149        He reports his newest medication is his cholesterol medicine.  Denies any history of hepatitis yellow jaundice tuberculosis.  He did have a tetanus shot and a flu shot.  And he is also had a upper respiratory illness treated on some steroids and antibiotics with a sore throat over the last 6-7 weeks.    This is recovering.    Denies any night sweats or weight loss no lymphadenopathy.    He does have some fatigue.  He denies any change in bruising or bleeding.    He denies any supplement use.  He does have a hernia however he is also trying to take care of his dad who is in terminal situation.        Platelet antibodies negative  Repeat labs showed a leukopenia, mild his peripheral smear showed erythrocytosis with some platelet clumping.  Methylmalonic acid.  His EPO level was normal,          Past Medical History:   Diagnosis Date    HLD (hyperlipidemia)     Prostate cancer       .  Past Surgical History:   Procedure Laterality Date    CHOLECYSTECTOMY      COLONOSCOPY  08/20/2019    LAPAROSCOPY      PROSTATE BIOPSY      PROSTATECTOMY      TONSILLECTOMY         .  Family History   Problem Relation Age of Onset    Hypertension Father     Hyperlipidemia Father     Hypertension Sister     Cancer Brother         Prostate    Hypertension Brother       Social History     Socioeconomic History    Marital status:    Tobacco Use    Smoking status: Never    Smokeless tobacco: Never   Substance and Sexual Activity    Alcohol use: Yes     Comment: Social drinker      .Review of patient's allergies indicates:  No Known Allergies     Current Outpatient Medications:     cholecalciferol, vitamin D3, 125 mcg (5,000 unit) Tab, Take 1 tablet by mouth once daily., Disp: , Rfl:     cyanocobalamin (VITAMIN B-12) 1000 MCG tablet, Take 100 mcg by mouth once daily., Disp: , Rfl:     folic acid/multivit-min/lutein (CENTRUM SILVER ORAL), Take by mouth., Disp: , Rfl:     rosuvastatin (CRESTOR) 20 MG tablet, Take 20 mg by mouth., Disp: , Rfl:    Review of Systems     See HPI  Objective:     Vitals:    11/06/23 1439   BP: 103/66   Pulse: 84   Temp: 98 °F (36.7 °C)           Physical Exam  Vitals reviewed.   Constitutional:       Appearance: Normal appearance.   HENT:      Head: Normocephalic and atraumatic.      Mouth/Throat:      Mouth: Mucous membranes are moist.      Pharynx: Oropharynx is clear.   Eyes:      Extraocular Movements: Extraocular movements intact.      Conjunctiva/sclera: Conjunctivae normal.      Pupils: Pupils are equal, round, and reactive to light.   Cardiovascular:      Rate and Rhythm: Normal rate and regular rhythm.      Pulses: Normal pulses.      Heart sounds: Normal heart sounds.   Pulmonary:      Effort: Pulmonary effort is normal.      Breath sounds: Normal breath sounds.   Abdominal:      General: Abdomen is flat. Bowel sounds are normal. There is no distension.      Palpations: Abdomen is soft.   Musculoskeletal:         General: Normal range of motion.      Cervical back: Normal range of motion and neck supple.   Skin:     General: Skin is warm and  dry.   Neurological:      General: No focal deficit present.      Mental Status: He is alert and oriented to person, place, and time. Mental status is at baseline.   Psychiatric:         Attention and Perception: Attention normal.         Mood and Affect: Mood normal.         Speech: Speech normal.         Behavior: Behavior normal.         Thought Content: Thought content normal.         Judgment: Judgment normal.       ECOG SCORE            .Lab Review:  Lab Review:  Results for orders placed or performed in visit on 11/06/23   CBC with Differential   Result Value Ref Range    WBC 4.15 (L) 4.50 - 11.50 x10(3)/mcL    RBC 4.72 4.70 - 6.10 x10(6)/mcL    Hgb 14.7 14.0 - 18.0 g/dL    Hct 45.6 42.0 - 52.0 %    MCV 96.6 (H) 80.0 - 94.0 fL    MCH 31.1 (H) 27.0 - 31.0 pg    MCHC 32.2 (L) 33.0 - 36.0 g/dL    RDW 12.3 11.5 - 17.0 %    Platelet 102 (L) 130 - 400 x10(3)/mcL    MPV 9.2 7.4 - 10.4 fL    Neut % 50.7 %    Lymph % 34.2 %    Mono % 10.8 %    Eos % 1.9 %    Basophil % 0.5 %    Lymph # 1.42 0.6 - 4.6 x10(3)/mcL    Neut # 2.10 2.1 - 9.2 x10(3)/mcL    Mono # 0.45 0.1 - 1.3 x10(3)/mcL    Eos # 0.08 0 - 0.9 x10(3)/mcL    Baso # 0.02 <=0.2 x10(3)/mcL    IG# 0.08 (H) 0 - 0.04 x10(3)/mcL    IG% 1.9 %      Assessment/Plan:   History of prostate carcinoma   Mild thrombocytopenia       This is a 62-year-old male with a history of prostate carcinoma and radiation therapy with mild thrombocytopenia.    He also has a history of recent URI currently on antibiotics.    We explained the differential diagnosis of thrombocytopenia including but not limited to : autoimmune, drug related, viral mediated, and underlying myelodysplasia or mds  related given his history.    He is no significant signs of sequelae related to his low platelets.    His platelets are greater than 100,000 now ---.    We discussed his workup today and labs and potential follow-up and bone marrow biopsies if no diagnosis or progressive.  At this point we still want  to watch.         Orders Placed This Encounter    CBC Auto Differential    Platelet Count   In blue top tube        Follow up in about 2 months (around 1/10/2024).     Alberto Finney MD

## 2023-11-03 DIAGNOSIS — D69.6 THROMBOCYTOPENIC DISORDER: Primary | ICD-10-CM

## 2023-11-06 ENCOUNTER — OFFICE VISIT (OUTPATIENT)
Dept: HEMATOLOGY/ONCOLOGY | Facility: CLINIC | Age: 63
End: 2023-11-06
Payer: COMMERCIAL

## 2023-11-06 VITALS
BODY MASS INDEX: 26.27 KG/M2 | TEMPERATURE: 98 F | HEART RATE: 84 BPM | WEIGHT: 187.63 LBS | OXYGEN SATURATION: 94 % | HEIGHT: 71 IN | SYSTOLIC BLOOD PRESSURE: 103 MMHG | DIASTOLIC BLOOD PRESSURE: 66 MMHG

## 2023-11-06 DIAGNOSIS — D69.6 THROMBOCYTOPENIC DISORDER: Primary | ICD-10-CM

## 2023-11-06 PROCEDURE — 3008F PR BODY MASS INDEX (BMI) DOCUMENTED: ICD-10-PCS | Mod: CPTII,S$GLB,, | Performed by: INTERNAL MEDICINE

## 2023-11-06 PROCEDURE — 1160F PR REVIEW ALL MEDS BY PRESCRIBER/CLIN PHARMACIST DOCUMENTED: ICD-10-PCS | Mod: CPTII,S$GLB,, | Performed by: INTERNAL MEDICINE

## 2023-11-06 PROCEDURE — 3074F SYST BP LT 130 MM HG: CPT | Mod: CPTII,S$GLB,, | Performed by: INTERNAL MEDICINE

## 2023-11-06 PROCEDURE — 1159F MED LIST DOCD IN RCRD: CPT | Mod: CPTII,S$GLB,, | Performed by: INTERNAL MEDICINE

## 2023-11-06 PROCEDURE — 99999 PR PBB SHADOW E&M-EST. PATIENT-LVL IV: CPT | Mod: PBBFAC,,, | Performed by: INTERNAL MEDICINE

## 2023-11-06 PROCEDURE — 99999 PR PBB SHADOW E&M-EST. PATIENT-LVL IV: ICD-10-PCS | Mod: PBBFAC,,, | Performed by: INTERNAL MEDICINE

## 2023-11-06 PROCEDURE — 1160F RVW MEDS BY RX/DR IN RCRD: CPT | Mod: CPTII,S$GLB,, | Performed by: INTERNAL MEDICINE

## 2023-11-06 PROCEDURE — 99213 OFFICE O/P EST LOW 20 MIN: CPT | Mod: S$GLB,,, | Performed by: INTERNAL MEDICINE

## 2023-11-06 PROCEDURE — 3078F PR MOST RECENT DIASTOLIC BLOOD PRESSURE < 80 MM HG: ICD-10-PCS | Mod: CPTII,S$GLB,, | Performed by: INTERNAL MEDICINE

## 2023-11-06 PROCEDURE — 1159F PR MEDICATION LIST DOCUMENTED IN MEDICAL RECORD: ICD-10-PCS | Mod: CPTII,S$GLB,, | Performed by: INTERNAL MEDICINE

## 2023-11-06 PROCEDURE — 3078F DIAST BP <80 MM HG: CPT | Mod: CPTII,S$GLB,, | Performed by: INTERNAL MEDICINE

## 2023-11-06 PROCEDURE — 3074F PR MOST RECENT SYSTOLIC BLOOD PRESSURE < 130 MM HG: ICD-10-PCS | Mod: CPTII,S$GLB,, | Performed by: INTERNAL MEDICINE

## 2023-11-06 PROCEDURE — 3008F BODY MASS INDEX DOCD: CPT | Mod: CPTII,S$GLB,, | Performed by: INTERNAL MEDICINE

## 2023-11-06 PROCEDURE — 99213 PR OFFICE/OUTPT VISIT, EST, LEVL III, 20-29 MIN: ICD-10-PCS | Mod: S$GLB,,, | Performed by: INTERNAL MEDICINE

## 2024-01-09 ENCOUNTER — OFFICE VISIT (OUTPATIENT)
Dept: HEMATOLOGY/ONCOLOGY | Facility: CLINIC | Age: 64
End: 2024-01-09
Payer: COMMERCIAL

## 2024-01-09 VITALS
HEART RATE: 69 BPM | SYSTOLIC BLOOD PRESSURE: 114 MMHG | WEIGHT: 189.13 LBS | DIASTOLIC BLOOD PRESSURE: 76 MMHG | OXYGEN SATURATION: 99 % | TEMPERATURE: 98 F | BODY MASS INDEX: 26.48 KG/M2 | HEIGHT: 71 IN

## 2024-01-09 DIAGNOSIS — Z85.46 HISTORY OF PROSTATE CANCER: ICD-10-CM

## 2024-01-09 DIAGNOSIS — D69.6 THROMBOCYTOPENIC DISORDER: Primary | ICD-10-CM

## 2024-01-09 PROCEDURE — 1159F MED LIST DOCD IN RCRD: CPT | Mod: CPTII,S$GLB,, | Performed by: NURSE PRACTITIONER

## 2024-01-09 PROCEDURE — 99213 OFFICE O/P EST LOW 20 MIN: CPT | Mod: S$GLB,,, | Performed by: NURSE PRACTITIONER

## 2024-01-09 PROCEDURE — 3074F SYST BP LT 130 MM HG: CPT | Mod: CPTII,S$GLB,, | Performed by: NURSE PRACTITIONER

## 2024-01-09 PROCEDURE — 3008F BODY MASS INDEX DOCD: CPT | Mod: CPTII,S$GLB,, | Performed by: NURSE PRACTITIONER

## 2024-01-09 PROCEDURE — 3078F DIAST BP <80 MM HG: CPT | Mod: CPTII,S$GLB,, | Performed by: NURSE PRACTITIONER

## 2024-01-09 PROCEDURE — 99999 PR PBB SHADOW E&M-EST. PATIENT-LVL III: CPT | Mod: PBBFAC,,, | Performed by: NURSE PRACTITIONER

## 2024-01-09 NOTE — PROGRESS NOTES
Subjective:        PATIENT: Jose Sarmiento  MRN: [id number]  DATE: 1/9/2024  Chief Complaint: Follow-up (Patient is here for his 2 month visit)        01/09/2024  Dr. Chun Collins  Reason for referral thrombocytopenia: Referred on 09/05/2023    This is a 62-year-old male who his followed by [facility] Dr. Mcgrath for prostate carcinoma.  His prostate surgery was not September 3rd night 2019.  He  had radiation therapy [facility] 10/15/2022   His PSA was 42 it [place] 7.  A T2 C in 7 cores,    He was seen by his primary care had his normal health examination, he is also noted to have a left inguinal hernia pertinent laboratory review was noted to have a white count 3.6 hematocrit of 43 and a platelet count of 110.    Previous CBC showed a platelet count of 115 several months ago and in June of 2022 was normal at 149        He reports his newest medication is his cholesterol medicine.  Denies any history of hepatitis yellow jaundice tuberculosis.  He did have a tetanus shot and a flu shot.  And he is also had a upper respiratory illness treated on some steroids and antibiotics with a sore throat over the last 6-7 weeks.    This is recovering.    Denies any night sweats or weight loss no lymphadenopathy.    He does have some fatigue.  He denies any change in bruising or bleeding.    He denies any supplement use.  He does have a hernia however he is also trying to take care of his dad who is in terminal situation.        Platelet antibodies negative  Repeat labs showed a leukopenia, mild his peripheral smear showed erythrocytosis with some platelet clumping.  Methylmalonic acid.  His EPO level was normal,            Past Medical History:   Diagnosis Date    HLD (hyperlipidemia)     Prostate cancer       .  Past Surgical History:   Procedure Laterality Date    CHOLECYSTECTOMY      COLONOSCOPY  08/20/2019    LAPAROSCOPY      PROSTATE BIOPSY      PROSTATECTOMY      TONSILLECTOMY        .  Family History   Problem Relation Age  of Onset    Hypertension Father     Hyperlipidemia Father     Hypertension Sister     Cancer Brother         Prostate    Hypertension Brother       Social History     Socioeconomic History    Marital status:    Tobacco Use    Smoking status: Never    Smokeless tobacco: Never   Substance and Sexual Activity    Alcohol use: Yes     Comment: Social drinker      .Review of patient's allergies indicates:  No Known Allergies     Current Outpatient Medications:     cholecalciferol, vitamin D3, 125 mcg (5,000 unit) Tab, Take 1 tablet by mouth once daily., Disp: , Rfl:     cyanocobalamin (VITAMIN B-12) 1000 MCG tablet, Take 100 mcg by mouth once daily., Disp: , Rfl:     folic acid/multivit-min/lutein (CENTRUM SILVER ORAL), Take by mouth., Disp: , Rfl:     rosuvastatin (CRESTOR) 20 MG tablet, Take 20 mg by mouth., Disp: , Rfl:    Review of Systems     See HPI  Objective:     Vitals:    01/09/24 1403   BP: 114/76   Pulse: 69   Temp: 97.9 °F (36.6 °C)        Physical Exam  Vitals reviewed.   Constitutional:       Appearance: Normal appearance.   HENT:      Head: Normocephalic and atraumatic.      Mouth/Throat:      Mouth: Mucous membranes are moist.      Pharynx: Oropharynx is clear.   Eyes:      Extraocular Movements: Extraocular movements intact.      Conjunctiva/sclera: Conjunctivae normal.      Pupils: Pupils are equal, round, and reactive to light.   Cardiovascular:      Rate and Rhythm: Normal rate and regular rhythm.      Pulses: Normal pulses.      Heart sounds: Normal heart sounds.   Pulmonary:      Effort: Pulmonary effort is normal.      Breath sounds: Normal breath sounds.   Abdominal:      General: Abdomen is flat. Bowel sounds are normal. There is no distension.      Palpations: Abdomen is soft.   Musculoskeletal:         General: Normal range of motion.      Cervical back: Normal range of motion and neck supple.   Skin:     General: Skin is warm and dry.   Neurological:      General: No focal deficit  present.      Mental Status: He is alert and oriented to person, place, and time. Mental status is at baseline.   Psychiatric:         Attention and Perception: Attention normal.         Mood and Affect: Mood normal.         Speech: Speech normal.         Behavior: Behavior normal.         Thought Content: Thought content normal.         Judgment: Judgment normal.         ECOG SCORE              Assessment/Plan:   History of prostate carcinoma   Mild thrombocytopenia       This is a 62-year-old male with a history of prostate carcinoma and radiation therapy with mild thrombocytopenia.    We explained the differential diagnosis of thrombocytopenia including but not limited to : autoimmune, drug related, viral mediated, and underlying myelodysplasia or mds  related given his history.    He has no significant signs of sequelae related to his low platelets.    His platelets are greater than 100,000 now    We discussed his workup today and labs and potential follow-up and bone marrow biopsies if no diagnosis or progressive.  At this point we still want to watch.            Follow up in about 3 months (around 4/9/2024) for CBC and OV with Dr. Finney.

## 2024-04-16 DIAGNOSIS — D69.6 THROMBOCYTOPENIC DISORDER: Primary | ICD-10-CM

## 2024-04-17 NOTE — PROGRESS NOTES
Subjective:        PATIENT: Jose Sarmiento  MRN: [id number]  DATE: 4/18/2024  Chief Complaint: Follow-up (Patient is here for his 3 month visit)        04/18/2024  Patient reports for follow up for thrombocytopenia with his wife.  He follows Urology Dr. Vergara for hx prostate carcinoma; next appointment is 12/2/2024.  He follows Dr. Collins for hernia; next appointment is July 2024.  He feels good; denies abnormal bleeding, SOB, rash, bruising and s/s of infection.  Reviewed CBC with patient; his platelet count is stable for hernia surgery.  Discussed surgeons available for hernia surgery.  Dr. Jimenez performed patients gall bladder surgery.  Discussed shingle vaccination; ok to take vaccine.        Past Medical History:   Diagnosis Date    HLD (hyperlipidemia)     Prostate cancer       .  Past Surgical History:   Procedure Laterality Date    CHOLECYSTECTOMY      COLONOSCOPY  08/20/2019    LAPAROSCOPY      PROSTATE BIOPSY      PROSTATECTOMY      TONSILLECTOMY        .  Family History   Problem Relation Name Age of Onset    Hypertension Father      Hyperlipidemia Father      Hypertension Sister      Cancer Brother          Prostate    Hypertension Brother        Social History     Socioeconomic History    Marital status:    Tobacco Use    Smoking status: Never    Smokeless tobacco: Never   Substance and Sexual Activity    Alcohol use: Yes     Comment: Social drinker      .Review of patient's allergies indicates:  No Known Allergies     Current Outpatient Medications:     cholecalciferol, vitamin D3, 125 mcg (5,000 unit) Tab, Take 1 tablet by mouth once daily., Disp: , Rfl:     cyanocobalamin (VITAMIN B-12) 1000 MCG tablet, Take 100 mcg by mouth once daily., Disp: , Rfl:     folic acid/multivit-min/lutein (CENTRUM SILVER ORAL), Take by mouth., Disp: , Rfl:     rosuvastatin (CRESTOR) 20 MG tablet, Take 20 mg by mouth., Disp: , Rfl:    Review of Systems     See HPI  Objective:     Vitals:    04/18/24 1444   BP:  101/66   Pulse: 65   Temp: 97.9 °F (36.6 °C)          Physical Exam  Vitals reviewed.   Constitutional:       Appearance: Normal appearance.   HENT:      Head: Normocephalic and atraumatic.      Mouth/Throat:      Mouth: Mucous membranes are moist.      Pharynx: Oropharynx is clear.   Eyes:      Extraocular Movements: Extraocular movements intact.      Conjunctiva/sclera: Conjunctivae normal.      Pupils: Pupils are equal, round, and reactive to light.   Cardiovascular:      Rate and Rhythm: Normal rate and regular rhythm.      Pulses: Normal pulses.      Heart sounds: Normal heart sounds.   Pulmonary:      Effort: Pulmonary effort is normal.      Breath sounds: Normal breath sounds.   Abdominal:      General: Abdomen is flat. Bowel sounds are normal. There is no distension.      Palpations: Abdomen is soft.   Musculoskeletal:         General: Normal range of motion.      Cervical back: Normal range of motion and neck supple.   Skin:     General: Skin is warm and dry.   Neurological:      General: No focal deficit present.      Mental Status: He is alert and oriented to person, place, and time. Mental status is at baseline.   Psychiatric:         Attention and Perception: Attention normal.         Mood and Affect: Mood normal.         Speech: Speech normal.         Behavior: Behavior normal.         Thought Content: Thought content normal.         Judgment: Judgment normal.         ECOG SCORE           Lab Review:  Results for orders placed or performed in visit on 04/18/24   CBC with Differential   Result Value Ref Range    WBC 4.62 4.50 - 11.50 x10(3)/mcL    RBC 4.93 4.70 - 6.10 x10(6)/mcL    Hgb 15.6 14.0 - 18.0 g/dL    Hct 46.4 42.0 - 52.0 %    MCV 94.1 (H) 80.0 - 94.0 fL    MCH 31.6 (H) 27.0 - 31.0 pg    MCHC 33.6 33.0 - 36.0 g/dL    RDW 12.5 11.5 - 17.0 %    Platelet 100 (L) 130 - 400 x10(3)/mcL    MPV 9.4 7.4 - 10.4 fL    Neut % 52.4 %    Lymph % 33.8 %    Mono % 10.2 %    Eos % 1.9 %    Basophil % 0.2 %     Lymph # 1.56 0.6 - 4.6 x10(3)/mcL    Neut # 2.42 2.1 - 9.2 x10(3)/mcL    Mono # 0.47 0.1 - 1.3 x10(3)/mcL    Eos # 0.09 0 - 0.9 x10(3)/mcL    Baso # 0.01 <=0.2 x10(3)/mcL    IG# 0.07 (H) 0 - 0.04 x10(3)/mcL    IG% 1.5 %   Past workup     Normal PT PTT fibrinogen LDH retic count, sed rate, platelet antibodies, JAK2 mutation, platelets improved from [id number]     Assessment/Plan:   History of prostate carcinoma   Mild thrombocytopenia     This patient has had extensive workup for his thrombocytopenia including platelet antibodies which were negative, B12 folate, methylmalonic acid, review of peripheral blood smear, there is a question of proliferation disorder he had a JAK2 mutation that was negative.  His platelet count improved from 82,000 it is low up to 100,000 where it has been.  He has no sequelae related to the above.    He is due for hernia repair.      With his platelets greater than 75,000 he is at low risk for bleeding complications from hernia repair   If his platelets were to go less than 100,000 would consider nuclear medicine liver-spleen scan, B1, B6, copper and if they stay lower then consider bone marrow biopsy.  He still at risk for myelodysplasia.  His PSA has been well-controlled continues to follow urology as well---as his platelet count really dipped since 2019 after he had his gallbladder surgery and his prostate cancer treatment         I, Kacy Birmingham LPN, acted solely as a scribe for and in the presence of, Dr. Alberto Finney who performed the service.      Alberto Finney MD

## 2024-04-18 ENCOUNTER — LAB VISIT (OUTPATIENT)
Dept: LAB | Facility: HOSPITAL | Age: 64
End: 2024-04-18
Attending: INTERNAL MEDICINE
Payer: COMMERCIAL

## 2024-04-18 ENCOUNTER — OFFICE VISIT (OUTPATIENT)
Dept: HEMATOLOGY/ONCOLOGY | Facility: CLINIC | Age: 64
End: 2024-04-18
Payer: COMMERCIAL

## 2024-04-18 VITALS
OXYGEN SATURATION: 98 % | HEIGHT: 71 IN | TEMPERATURE: 98 F | HEART RATE: 65 BPM | DIASTOLIC BLOOD PRESSURE: 66 MMHG | WEIGHT: 186.31 LBS | SYSTOLIC BLOOD PRESSURE: 101 MMHG | BODY MASS INDEX: 26.08 KG/M2

## 2024-04-18 DIAGNOSIS — Z85.46 HISTORY OF PROSTATE CANCER: ICD-10-CM

## 2024-04-18 DIAGNOSIS — D69.6 THROMBOCYTOPENIC DISORDER: ICD-10-CM

## 2024-04-18 DIAGNOSIS — D69.6 THROMBOCYTOPENIC DISORDER: Primary | ICD-10-CM

## 2024-04-18 LAB
BASOPHILS # BLD AUTO: 0.01 X10(3)/MCL
BASOPHILS NFR BLD AUTO: 0.2 %
EOSINOPHIL # BLD AUTO: 0.09 X10(3)/MCL (ref 0–0.9)
EOSINOPHIL NFR BLD AUTO: 1.9 %
ERYTHROCYTE [DISTWIDTH] IN BLOOD BY AUTOMATED COUNT: 12.5 % (ref 11.5–17)
HCT VFR BLD AUTO: 46.4 % (ref 42–52)
HGB BLD-MCNC: 15.6 G/DL (ref 14–18)
IMM GRANULOCYTES # BLD AUTO: 0.07 X10(3)/MCL (ref 0–0.04)
IMM GRANULOCYTES NFR BLD AUTO: 1.5 %
LYMPHOCYTES # BLD AUTO: 1.56 X10(3)/MCL (ref 0.6–4.6)
LYMPHOCYTES NFR BLD AUTO: 33.8 %
MCH RBC QN AUTO: 31.6 PG (ref 27–31)
MCHC RBC AUTO-ENTMCNC: 33.6 G/DL (ref 33–36)
MCV RBC AUTO: 94.1 FL (ref 80–94)
MONOCYTES # BLD AUTO: 0.47 X10(3)/MCL (ref 0.1–1.3)
MONOCYTES NFR BLD AUTO: 10.2 %
NEUTROPHILS # BLD AUTO: 2.42 X10(3)/MCL (ref 2.1–9.2)
NEUTROPHILS NFR BLD AUTO: 52.4 %
PLATELET # BLD AUTO: 100 X10(3)/MCL (ref 130–400)
PMV BLD AUTO: 9.4 FL (ref 7.4–10.4)
RBC # BLD AUTO: 4.93 X10(6)/MCL (ref 4.7–6.1)
WBC # SPEC AUTO: 4.62 X10(3)/MCL (ref 4.5–11.5)

## 2024-04-18 PROCEDURE — 99999 PR PBB SHADOW E&M-EST. PATIENT-LVL III: CPT | Mod: PBBFAC,,, | Performed by: INTERNAL MEDICINE

## 2024-04-18 PROCEDURE — 3078F DIAST BP <80 MM HG: CPT | Mod: CPTII,S$GLB,, | Performed by: INTERNAL MEDICINE

## 2024-04-18 PROCEDURE — 3074F SYST BP LT 130 MM HG: CPT | Mod: CPTII,S$GLB,, | Performed by: INTERNAL MEDICINE

## 2024-04-18 PROCEDURE — 99213 OFFICE O/P EST LOW 20 MIN: CPT | Mod: S$GLB,,, | Performed by: INTERNAL MEDICINE

## 2024-04-18 PROCEDURE — 85025 COMPLETE CBC W/AUTO DIFF WBC: CPT

## 2024-04-18 PROCEDURE — 1160F RVW MEDS BY RX/DR IN RCRD: CPT | Mod: CPTII,S$GLB,, | Performed by: INTERNAL MEDICINE

## 2024-04-18 PROCEDURE — 36415 COLL VENOUS BLD VENIPUNCTURE: CPT

## 2024-04-18 PROCEDURE — 3008F BODY MASS INDEX DOCD: CPT | Mod: CPTII,S$GLB,, | Performed by: INTERNAL MEDICINE

## 2024-04-18 PROCEDURE — 1159F MED LIST DOCD IN RCRD: CPT | Mod: CPTII,S$GLB,, | Performed by: INTERNAL MEDICINE

## 2024-06-03 DIAGNOSIS — K40.90 LEFT INGUINAL HERNIA: Primary | ICD-10-CM

## 2024-09-07 NOTE — PROGRESS NOTES
Subjective:        PATIENT: Jose Sarmiento  MRN: [id number]  DATE: 9/19/2024  Chief Complaint: Pt in office for 5m f/u        09/19/2024  Patient reports for follow up for thrombocytopenia with his wife.  He follows Urology Dr. Vergara for hx prostate carcinoma; next appointment is 12/2/2024.    Has not had surgery yet  Only new symptom in foot burring at night one side  No other symptoms-- was told to treat like plantar with ice massage and a ball.  He was not started that yet either.  Denies any other neuropathic symptoms.  No bleeding no bruising.            Past Medical History:   Diagnosis Date    HLD (hyperlipidemia)     Prostate cancer       .  Past Surgical History:   Procedure Laterality Date    CHOLECYSTECTOMY      COLONOSCOPY  08/20/2019    LAPAROSCOPY      PROSTATE BIOPSY      PROSTATECTOMY      TONSILLECTOMY        .  Family History   Problem Relation Name Age of Onset    Hypertension Father      Hyperlipidemia Father      Hypertension Sister      Cancer Brother          Prostate    Hypertension Brother        Social History     Socioeconomic History    Marital status:    Tobacco Use    Smoking status: Never    Smokeless tobacco: Never   Substance and Sexual Activity    Alcohol use: Yes     Comment: Social drinker      .Review of patient's allergies indicates:  No Known Allergies     Current Outpatient Medications:     cholecalciferol, vitamin D3, 125 mcg (5,000 unit) Tab, Take 1 tablet by mouth once daily., Disp: , Rfl:     cyanocobalamin (VITAMIN B-12) 1000 MCG tablet, Take 100 mcg by mouth once daily., Disp: , Rfl:     folic acid/multivit-min/lutein (CENTRUM SILVER ORAL), Take by mouth., Disp: , Rfl:     rosuvastatin (CRESTOR) 20 MG tablet, Take 20 mg by mouth., Disp: , Rfl:    Review of Systems     See HPI  Objective:     Vitals:    09/19/24 1445   BP: 100/70   Pulse: 64   Resp: 20   Temp: 98 °F (36.7 °C)            Physical Exam  Vitals reviewed.   Constitutional:       Appearance: Normal  appearance.   HENT:      Head: Normocephalic and atraumatic.      Mouth/Throat:      Mouth: Mucous membranes are moist.      Pharynx: Oropharynx is clear.   Eyes:      Extraocular Movements: Extraocular movements intact.      Conjunctiva/sclera: Conjunctivae normal.      Pupils: Pupils are equal, round, and reactive to light.   Cardiovascular:      Rate and Rhythm: Normal rate and regular rhythm.      Pulses: Normal pulses.      Heart sounds: Normal heart sounds.   Pulmonary:      Effort: Pulmonary effort is normal.      Breath sounds: Normal breath sounds.   Abdominal:      General: Abdomen is flat. Bowel sounds are normal. There is no distension.      Palpations: Abdomen is soft.   Musculoskeletal:         General: Normal range of motion.      Cervical back: Normal range of motion and neck supple.   Skin:     General: Skin is warm and dry.   Neurological:      General: No focal deficit present.      Mental Status: He is alert and oriented to person, place, and time. Mental status is at baseline.   Psychiatric:         Attention and Perception: Attention normal.         Mood and Affect: Mood normal.         Speech: Speech normal.         Behavior: Behavior normal.         Thought Content: Thought content normal.         Judgment: Judgment normal.         ECOG SCORE           Lab Review:  CBC:   Recent Labs     09/19/24  1421   WBC 5.26   RBC 4.83   HGB 15.6   HCT 45.6   PLT 94*         Assessment/Plan:   History of prostate carcinoma   Mild thrombocytopenia     This patient has had extensive workup for his thrombocytopenia including platelet antibodies which were negative, B12 folate, methylmalonic acid, review of peripheral blood smear, there is a question of proliferation disorder he had a JAK2 mutation that was negative.  His platelet count improved from 82,000 it is low up to 100,000 where it has been.  He has no sequelae related to the above.    He is due for hernia repair.      With his platelets greater than  75,000 he is at low risk for bleeding complications from hernia repair   would consider nuclear medicine liver-spleen scan,     Will check B1, B6, copper  and  repeat b12, folate  and if they stay lower then consider bone marrow biopsy.  He still at risk for myelodysplasia.  His PSA has been well-controlled continues to follow urology as well---as his platelet count really dipped since 2019 after he had his gallbladder surgery and his prostate cancer treatment  Orders Placed This Encounter    Vitamin B1, WB    Pyridoxal 5-Phosphate (PLP)    Copper, Serum    Vitamin B12    Folate    CBC Auto Differential        Follow up in about 6 months (around 3/19/2025) for with repeat cbc same day.     Alberto Finney MD

## 2024-09-18 ENCOUNTER — TELEPHONE (OUTPATIENT)
Dept: SURGERY | Facility: CLINIC | Age: 64
End: 2024-09-18
Payer: COMMERCIAL

## 2024-09-19 ENCOUNTER — OFFICE VISIT (OUTPATIENT)
Dept: HEMATOLOGY/ONCOLOGY | Facility: CLINIC | Age: 64
End: 2024-09-19
Payer: COMMERCIAL

## 2024-09-19 ENCOUNTER — LAB VISIT (OUTPATIENT)
Dept: LAB | Facility: HOSPITAL | Age: 64
End: 2024-09-19
Attending: INTERNAL MEDICINE
Payer: COMMERCIAL

## 2024-09-19 VITALS
DIASTOLIC BLOOD PRESSURE: 70 MMHG | HEART RATE: 64 BPM | WEIGHT: 186.81 LBS | SYSTOLIC BLOOD PRESSURE: 100 MMHG | TEMPERATURE: 98 F | RESPIRATION RATE: 20 BRPM | OXYGEN SATURATION: 98 % | HEIGHT: 71 IN | BODY MASS INDEX: 26.15 KG/M2

## 2024-09-19 DIAGNOSIS — D69.6 THROMBOCYTOPENIC DISORDER: ICD-10-CM

## 2024-09-19 DIAGNOSIS — D69.6 THROMBOCYTOPENIC DISORDER: Primary | ICD-10-CM

## 2024-09-19 LAB
BASOPHILS # BLD AUTO: 0.02 X10(3)/MCL
BASOPHILS NFR BLD AUTO: 0.4 %
EOSINOPHIL # BLD AUTO: 0.07 X10(3)/MCL (ref 0–0.9)
EOSINOPHIL NFR BLD AUTO: 1.3 %
ERYTHROCYTE [DISTWIDTH] IN BLOOD BY AUTOMATED COUNT: 12.6 % (ref 11.5–17)
FOLATE SERPL-MCNC: 17.4 NG/ML (ref 7–31.4)
HCT VFR BLD AUTO: 45.6 % (ref 42–52)
HGB BLD-MCNC: 15.6 G/DL (ref 14–18)
IMM GRANULOCYTES # BLD AUTO: 0.11 X10(3)/MCL (ref 0–0.04)
IMM GRANULOCYTES NFR BLD AUTO: 2.1 %
LYMPHOCYTES # BLD AUTO: 1.79 X10(3)/MCL (ref 0.6–4.6)
LYMPHOCYTES NFR BLD AUTO: 34 %
MCH RBC QN AUTO: 32.3 PG (ref 27–31)
MCHC RBC AUTO-ENTMCNC: 34.2 G/DL (ref 33–36)
MCV RBC AUTO: 94.4 FL (ref 80–94)
MONOCYTES # BLD AUTO: 1.28 X10(3)/MCL (ref 0.1–1.3)
MONOCYTES NFR BLD AUTO: 24.3 %
NEUTROPHILS # BLD AUTO: 1.99 X10(3)/MCL (ref 2.1–9.2)
NEUTROPHILS NFR BLD AUTO: 37.9 %
PLATELET # BLD AUTO: 94 X10(3)/MCL (ref 130–400)
PMV BLD AUTO: 9.5 FL (ref 7.4–10.4)
RBC # BLD AUTO: 4.83 X10(6)/MCL (ref 4.7–6.1)
VIT B12 SERPL-MCNC: 811 PG/ML (ref 213–816)
WBC # BLD AUTO: 5.26 X10(3)/MCL (ref 4.5–11.5)

## 2024-09-19 PROCEDURE — 1160F RVW MEDS BY RX/DR IN RCRD: CPT | Mod: CPTII,S$GLB,, | Performed by: INTERNAL MEDICINE

## 2024-09-19 PROCEDURE — 82525 ASSAY OF COPPER: CPT | Performed by: INTERNAL MEDICINE

## 2024-09-19 PROCEDURE — 3008F BODY MASS INDEX DOCD: CPT | Mod: CPTII,S$GLB,, | Performed by: INTERNAL MEDICINE

## 2024-09-19 PROCEDURE — 36415 COLL VENOUS BLD VENIPUNCTURE: CPT

## 2024-09-19 PROCEDURE — 3074F SYST BP LT 130 MM HG: CPT | Mod: CPTII,S$GLB,, | Performed by: INTERNAL MEDICINE

## 2024-09-19 PROCEDURE — 82607 VITAMIN B-12: CPT | Performed by: INTERNAL MEDICINE

## 2024-09-19 PROCEDURE — 99999 PR PBB SHADOW E&M-EST. PATIENT-LVL IV: CPT | Mod: PBBFAC,,, | Performed by: INTERNAL MEDICINE

## 2024-09-19 PROCEDURE — 1159F MED LIST DOCD IN RCRD: CPT | Mod: CPTII,S$GLB,, | Performed by: INTERNAL MEDICINE

## 2024-09-19 PROCEDURE — 3078F DIAST BP <80 MM HG: CPT | Mod: CPTII,S$GLB,, | Performed by: INTERNAL MEDICINE

## 2024-09-19 PROCEDURE — 99213 OFFICE O/P EST LOW 20 MIN: CPT | Mod: S$GLB,,, | Performed by: INTERNAL MEDICINE

## 2024-09-19 PROCEDURE — 36415 COLL VENOUS BLD VENIPUNCTURE: CPT | Performed by: INTERNAL MEDICINE

## 2024-09-19 PROCEDURE — 85025 COMPLETE CBC W/AUTO DIFF WBC: CPT

## 2024-09-19 PROCEDURE — 82746 ASSAY OF FOLIC ACID SERUM: CPT | Performed by: INTERNAL MEDICINE

## 2024-09-20 LAB — COPPER SERPL-MCNC: 87 MCG/DL (ref 73–129)

## 2024-10-11 NOTE — PROGRESS NOTES
HISTORY & PHYSICAL  General Surgery    Patient Name: Jose Sarmiento  YOB: 1960    Date: 10/11/2024                   SUBJECTIVE:     Chief Complaint/Reason for Admission: No chief complaint on file.       History of Present Illness:  Mr. Jose Sarmiento is a 63 y.o. male who reports he started noticing a bulge in his left groin.  It occasionally causes some pain and discomfort. He has a history of strenuous activity/lifting in the past.  Denies any changes to bowel / bladder habits. He denies CP/SOB or fever/chills.     Previous abdominal surgeries: Laparoscopic converted to open Cholecystectomy, Robot assisted Radical Prostatectomy with bilateral pelvic lymph node dissection    Review of Systems:  12 point ROS negative except as stated in HPI    HISTORY:     Past Medical History:   Diagnosis Date    HLD (hyperlipidemia)     Left inguinal hernia     Prostate cancer      Past Surgical History:   Procedure Laterality Date    CHOLECYSTECTOMY      COLONOSCOPY  08/20/2019    LAPAROSCOPY      PROSTATE BIOPSY      PROSTATECTOMY      TONSILLECTOMY       Family History   Problem Relation Name Age of Onset    Hypertension Father      Hyperlipidemia Father      Hypertension Sister      Cancer Brother          Prostate    Hypertension Brother       Social History     Socioeconomic History    Marital status:    Tobacco Use    Smoking status: Never    Smokeless tobacco: Never   Substance and Sexual Activity    Alcohol use: Yes     Comment: Social drinker       MEDICATIONS & ALLERGIES:     Current Outpatient Medications on File Prior to Visit   Medication Sig    cholecalciferol, vitamin D3, 125 mcg (5,000 unit) Tab Take 1 tablet by mouth once daily.    cyanocobalamin (VITAMIN B-12) 1000 MCG tablet Take 100 mcg by mouth once daily.    folic acid/multivit-min/lutein (CENTRUM SILVER ORAL) Take by mouth.    rosuvastatin (CRESTOR) 20 MG tablet Take 20 mg by mouth.     No current facility-administered medications  on file prior to visit.     Review of patient's allergies indicates:  No Known Allergies    OBJECTIVE:   There were no vitals filed for this visit.   There is no height or weight on file to calculate BMI.     Physical Exam:  General:  Well developed, well nourished, no acute distress  HEENT:  Normocephalic, atraumatic, PERRL, EOMI, clear sclera, ears normal, neck supple, throat clear without erythema or exudates  CVS:  RRR, S1 and S2 normal, no murmurs, rubs, gallops  Resp:  Lungs clear to auscultation, no wheezes, rales, rhonchi, cough  GI:  Abdomen soft, non-tender, non-distended, normoactive bowel sounds, no masses,    :   R groin - no hernia appreciated, nttp, descended testicle  L groin - Reducible Large Left Inguinal Hernia, nttp, descended testicle  MSK:  No muscle atrophy, cyanosis, peripheral edema, full range of motion  Skin:  No rashes, ulcers, erythema  Neuro:  CNII-XII grossly intact  Psych:  Alert and oriented to person, place, and time    Results:  I have independently reviewed all pertinent lab and radiologic studies relevant to general/bariatric surgery.    ASSESSMENT & PLAN:   Diagnoses:    ICD-10-CM ICD-9-CM   1. Preop examination  Z01.818 V72.84   2. Left inguinal hernia  K40.90 550.90        Plan:  Open Left Inguinal Hernia Repair  Preoperative workup as ordered

## 2024-10-16 ENCOUNTER — OFFICE VISIT (OUTPATIENT)
Dept: SURGERY | Facility: CLINIC | Age: 64
End: 2024-10-16
Payer: COMMERCIAL

## 2024-10-16 VITALS — BODY MASS INDEX: 26.05 KG/M2 | DIASTOLIC BLOOD PRESSURE: 77 MMHG | SYSTOLIC BLOOD PRESSURE: 107 MMHG | HEIGHT: 71 IN

## 2024-10-16 DIAGNOSIS — Z01.818 PREOP EXAMINATION: Primary | ICD-10-CM

## 2024-10-16 DIAGNOSIS — K40.90 LEFT INGUINAL HERNIA: ICD-10-CM

## 2024-10-16 PROCEDURE — 3074F SYST BP LT 130 MM HG: CPT | Mod: CPTII,,, | Performed by: SURGERY

## 2024-10-16 PROCEDURE — 3008F BODY MASS INDEX DOCD: CPT | Mod: CPTII,,, | Performed by: SURGERY

## 2024-10-16 PROCEDURE — 3078F DIAST BP <80 MM HG: CPT | Mod: CPTII,,, | Performed by: SURGERY

## 2024-10-16 PROCEDURE — 99203 OFFICE O/P NEW LOW 30 MIN: CPT | Mod: ,,, | Performed by: SURGERY

## 2024-10-16 PROCEDURE — 1160F RVW MEDS BY RX/DR IN RCRD: CPT | Mod: CPTII,,, | Performed by: SURGERY

## 2024-10-16 PROCEDURE — 1159F MED LIST DOCD IN RCRD: CPT | Mod: CPTII,,, | Performed by: SURGERY

## 2024-10-17 DIAGNOSIS — K40.90 LEFT INGUINAL HERNIA: Primary | ICD-10-CM

## 2024-11-04 ENCOUNTER — TELEPHONE (OUTPATIENT)
Dept: SURGERY | Facility: CLINIC | Age: 64
End: 2024-11-04

## 2024-12-23 ENCOUNTER — PATIENT MESSAGE (OUTPATIENT)
Dept: SURGERY | Facility: CLINIC | Age: 64
End: 2024-12-23
Payer: COMMERCIAL

## 2024-12-23 ENCOUNTER — TELEPHONE (OUTPATIENT)
Dept: SURGERY | Facility: CLINIC | Age: 64
End: 2024-12-23
Payer: COMMERCIAL

## 2025-01-02 ENCOUNTER — HOSPITAL ENCOUNTER (OUTPATIENT)
Dept: RADIOLOGY | Facility: HOSPITAL | Age: 65
Discharge: HOME | End: 2025-01-02
Attending: SURGERY
Payer: COMMERCIAL

## 2025-01-02 DIAGNOSIS — Z01.818 PREOP EXAMINATION: ICD-10-CM

## 2025-01-02 PROCEDURE — 71045 X-RAY EXAM CHEST 1 VIEW: CPT | Mod: TC

## 2025-01-08 ENCOUNTER — ANESTHESIA EVENT (OUTPATIENT)
Facility: HOSPITAL | Age: 65
End: 2025-01-08
Payer: COMMERCIAL

## 2025-01-09 ENCOUNTER — HOSPITAL ENCOUNTER (OUTPATIENT)
Facility: HOSPITAL | Age: 65
Discharge: HOME | End: 2025-01-09
Attending: SURGERY | Admitting: SURGERY
Payer: COMMERCIAL

## 2025-01-09 ENCOUNTER — ANESTHESIA (OUTPATIENT)
Facility: HOSPITAL | Age: 65
End: 2025-01-09
Payer: COMMERCIAL

## 2025-01-09 DIAGNOSIS — K40.90 LEFT INGUINAL HERNIA: Primary | ICD-10-CM

## 2025-01-09 PROCEDURE — 36000707: Performed by: SURGERY

## 2025-01-09 PROCEDURE — C1781 MESH (IMPLANTABLE): HCPCS | Performed by: SURGERY

## 2025-01-09 PROCEDURE — C1729 CATH, DRAINAGE: HCPCS | Performed by: SURGERY

## 2025-01-09 PROCEDURE — 37000009 HC ANESTHESIA EA ADD 15 MINS: Performed by: SURGERY

## 2025-01-09 PROCEDURE — 36000706: Performed by: SURGERY

## 2025-01-09 PROCEDURE — 71000015 HC POSTOP RECOV 1ST HR: Performed by: SURGERY

## 2025-01-09 PROCEDURE — 49505 PRP I/HERN INIT REDUC >5 YR: CPT | Mod: LT,,, | Performed by: SURGERY

## 2025-01-09 PROCEDURE — 71000016 HC POSTOP RECOV ADDL HR: Performed by: SURGERY

## 2025-01-09 PROCEDURE — 71000033 HC RECOVERY, INTIAL HOUR: Performed by: SURGERY

## 2025-01-09 PROCEDURE — 63600175 PHARM REV CODE 636 W HCPCS: Performed by: NURSE ANESTHETIST, CERTIFIED REGISTERED

## 2025-01-09 PROCEDURE — 63600175 PHARM REV CODE 636 W HCPCS: Performed by: SURGERY

## 2025-01-09 PROCEDURE — 63600175 PHARM REV CODE 636 W HCPCS: Performed by: ANESTHESIOLOGY

## 2025-01-09 PROCEDURE — 25000003 PHARM REV CODE 250: Performed by: ANESTHESIOLOGY

## 2025-01-09 PROCEDURE — 25000003 PHARM REV CODE 250: Performed by: SURGERY

## 2025-01-09 PROCEDURE — 49505 PRP I/HERN INIT REDUC >5 YR: CPT | Mod: AS,LT,,

## 2025-01-09 PROCEDURE — 25000003 PHARM REV CODE 250: Performed by: NURSE ANESTHETIST, CERTIFIED REGISTERED

## 2025-01-09 PROCEDURE — 37000008 HC ANESTHESIA 1ST 15 MINUTES: Performed by: SURGERY

## 2025-01-09 DEVICE — MESH PROGRIP POLYPR 15X9CM: Type: IMPLANTABLE DEVICE | Site: GROIN | Status: FUNCTIONAL

## 2025-01-09 RX ORDER — CELECOXIB 200 MG/1
200 CAPSULE ORAL
Status: COMPLETED | OUTPATIENT
Start: 2025-01-09 | End: 2025-01-09

## 2025-01-09 RX ORDER — SODIUM CHLORIDE, SODIUM LACTATE, POTASSIUM CHLORIDE, CALCIUM CHLORIDE 600; 310; 30; 20 MG/100ML; MG/100ML; MG/100ML; MG/100ML
INJECTION, SOLUTION INTRAVENOUS CONTINUOUS
Status: DISCONTINUED | OUTPATIENT
Start: 2025-01-09 | End: 2025-01-09 | Stop reason: HOSPADM

## 2025-01-09 RX ORDER — DEXAMETHASONE SODIUM PHOSPHATE 4 MG/ML
INJECTION, SOLUTION INTRA-ARTICULAR; INTRALESIONAL; INTRAMUSCULAR; INTRAVENOUS; SOFT TISSUE
Status: DISCONTINUED | OUTPATIENT
Start: 2025-01-09 | End: 2025-01-09

## 2025-01-09 RX ORDER — BUPIVACAINE HYDROCHLORIDE AND EPINEPHRINE 5; 5 MG/ML; UG/ML
INJECTION, SOLUTION EPIDURAL; INTRACAUDAL; PERINEURAL
Status: DISCONTINUED
Start: 2025-01-09 | End: 2025-01-09 | Stop reason: HOSPADM

## 2025-01-09 RX ORDER — HYDROCODONE BITARTRATE AND ACETAMINOPHEN 7.5; 325 MG/1; MG/1
1 TABLET ORAL EVERY 6 HOURS PRN
Status: DISCONTINUED | OUTPATIENT
Start: 2025-01-09 | End: 2025-01-09 | Stop reason: HOSPADM

## 2025-01-09 RX ORDER — HYDROCODONE BITARTRATE AND ACETAMINOPHEN 7.5; 325 MG/1; MG/1
1 TABLET ORAL EVERY 6 HOURS PRN
Qty: 20 TABLET | Refills: 0 | Status: SHIPPED | OUTPATIENT
Start: 2025-01-09

## 2025-01-09 RX ORDER — LIDOCAINE HYDROCHLORIDE 10 MG/ML
1 INJECTION, SOLUTION EPIDURAL; INFILTRATION; INTRACAUDAL; PERINEURAL ONCE
Status: DISCONTINUED | OUTPATIENT
Start: 2025-01-09 | End: 2025-01-09 | Stop reason: HOSPADM

## 2025-01-09 RX ORDER — PHENYLEPHRINE HYDROCHLORIDE 10 MG/ML
INJECTION INTRAVENOUS
Status: DISCONTINUED | OUTPATIENT
Start: 2025-01-09 | End: 2025-01-09

## 2025-01-09 RX ORDER — CEFAZOLIN SODIUM 1 G/3ML
INJECTION, POWDER, FOR SOLUTION INTRAMUSCULAR; INTRAVENOUS
Status: DISCONTINUED
Start: 2025-01-09 | End: 2025-01-09 | Stop reason: HOSPADM

## 2025-01-09 RX ORDER — GABAPENTIN 300 MG/1
300 CAPSULE ORAL
Status: COMPLETED | OUTPATIENT
Start: 2025-01-09 | End: 2025-01-09

## 2025-01-09 RX ORDER — TRAMADOL HYDROCHLORIDE 50 MG/1
50 TABLET ORAL EVERY 4 HOURS PRN
Status: DISCONTINUED | OUTPATIENT
Start: 2025-01-09 | End: 2025-01-09 | Stop reason: HOSPADM

## 2025-01-09 RX ORDER — ONDANSETRON HYDROCHLORIDE 2 MG/ML
4 INJECTION, SOLUTION INTRAVENOUS ONCE AS NEEDED
Status: DISCONTINUED | OUTPATIENT
Start: 2025-01-09 | End: 2025-01-09 | Stop reason: HOSPADM

## 2025-01-09 RX ORDER — PROPOFOL 10 MG/ML
VIAL (ML) INTRAVENOUS
Status: DISCONTINUED | OUTPATIENT
Start: 2025-01-09 | End: 2025-01-09

## 2025-01-09 RX ORDER — HYDROMORPHONE HYDROCHLORIDE 2 MG/ML
0.4 INJECTION, SOLUTION INTRAMUSCULAR; INTRAVENOUS; SUBCUTANEOUS EVERY 5 MIN PRN
Status: DISCONTINUED | OUTPATIENT
Start: 2025-01-09 | End: 2025-01-09 | Stop reason: HOSPADM

## 2025-01-09 RX ORDER — ONDANSETRON 4 MG/1
4 TABLET, ORALLY DISINTEGRATING ORAL ONCE
Status: COMPLETED | OUTPATIENT
Start: 2025-01-09 | End: 2025-01-09

## 2025-01-09 RX ORDER — ACETAMINOPHEN 500 MG
1000 TABLET ORAL
Status: COMPLETED | OUTPATIENT
Start: 2025-01-09 | End: 2025-01-09

## 2025-01-09 RX ORDER — BUPIVACAINE HYDROCHLORIDE AND EPINEPHRINE 2.5; 5 MG/ML; UG/ML
INJECTION, SOLUTION EPIDURAL; INFILTRATION; INTRACAUDAL; PERINEURAL
Status: DISCONTINUED | OUTPATIENT
Start: 2025-01-09 | End: 2025-01-09 | Stop reason: HOSPADM

## 2025-01-09 RX ORDER — ENOXAPARIN SODIUM 100 MG/ML
40 INJECTION SUBCUTANEOUS
Status: DISCONTINUED | OUTPATIENT
Start: 2025-01-09 | End: 2025-01-09 | Stop reason: HOSPADM

## 2025-01-09 RX ORDER — CEFAZOLIN SODIUM 1 G/3ML
INJECTION, POWDER, FOR SOLUTION INTRAMUSCULAR; INTRAVENOUS
Status: DISCONTINUED | OUTPATIENT
Start: 2025-01-09 | End: 2025-01-09 | Stop reason: HOSPADM

## 2025-01-09 RX ORDER — MIDAZOLAM HYDROCHLORIDE 2 MG/2ML
2 INJECTION, SOLUTION INTRAMUSCULAR; INTRAVENOUS ONCE AS NEEDED
Status: COMPLETED | OUTPATIENT
Start: 2025-01-09 | End: 2025-01-09

## 2025-01-09 RX ORDER — PROCHLORPERAZINE EDISYLATE 5 MG/ML
5 INJECTION INTRAMUSCULAR; INTRAVENOUS EVERY 30 MIN PRN
Status: DISCONTINUED | OUTPATIENT
Start: 2025-01-09 | End: 2025-01-09 | Stop reason: HOSPADM

## 2025-01-09 RX ORDER — CEFAZOLIN 2 G/1
2 INJECTION, POWDER, FOR SOLUTION INTRAMUSCULAR; INTRAVENOUS
Status: DISCONTINUED | OUTPATIENT
Start: 2025-01-09 | End: 2025-01-09 | Stop reason: HOSPADM

## 2025-01-09 RX ORDER — FENTANYL CITRATE 50 UG/ML
INJECTION, SOLUTION INTRAMUSCULAR; INTRAVENOUS
Status: DISCONTINUED | OUTPATIENT
Start: 2025-01-09 | End: 2025-01-09

## 2025-01-09 RX ORDER — ROCURONIUM BROMIDE 10 MG/ML
INJECTION, SOLUTION INTRAVENOUS
Status: DISCONTINUED | OUTPATIENT
Start: 2025-01-09 | End: 2025-01-09

## 2025-01-09 RX ORDER — MEPERIDINE HYDROCHLORIDE 25 MG/ML
50 INJECTION INTRAMUSCULAR; INTRAVENOUS; SUBCUTANEOUS
Status: DISCONTINUED | OUTPATIENT
Start: 2025-01-09 | End: 2025-01-09 | Stop reason: HOSPADM

## 2025-01-09 RX ORDER — ONDANSETRON HYDROCHLORIDE 2 MG/ML
4 INJECTION, SOLUTION INTRAVENOUS EVERY 4 HOURS PRN
Status: DISCONTINUED | OUTPATIENT
Start: 2025-01-09 | End: 2025-01-09 | Stop reason: HOSPADM

## 2025-01-09 RX ORDER — LIDOCAINE HYDROCHLORIDE 10 MG/ML
INJECTION, SOLUTION EPIDURAL; INFILTRATION; INTRACAUDAL; PERINEURAL
Status: DISCONTINUED | OUTPATIENT
Start: 2025-01-09 | End: 2025-01-09

## 2025-01-09 RX ORDER — GLUCAGON 1 MG
1 KIT INJECTION
Status: DISCONTINUED | OUTPATIENT
Start: 2025-01-09 | End: 2025-01-09 | Stop reason: HOSPADM

## 2025-01-09 RX ADMIN — CELECOXIB 200 MG: 200 CAPSULE ORAL at 07:01

## 2025-01-09 RX ADMIN — FENTANYL CITRATE 50 MCG: 50 INJECTION, SOLUTION INTRAMUSCULAR; INTRAVENOUS at 09:01

## 2025-01-09 RX ADMIN — ONDANSETRON 4 MG: 4 TABLET, ORALLY DISINTEGRATING ORAL at 07:01

## 2025-01-09 RX ADMIN — ROCURONIUM BROMIDE 20 MG: 10 INJECTION, SOLUTION INTRAVENOUS at 10:01

## 2025-01-09 RX ADMIN — FENTANYL CITRATE 50 MCG: 50 INJECTION, SOLUTION INTRAMUSCULAR; INTRAVENOUS at 10:01

## 2025-01-09 RX ADMIN — PHENYLEPHRINE HYDROCHLORIDE 100 MCG: 10 INJECTION INTRAVENOUS at 09:01

## 2025-01-09 RX ADMIN — DEXAMETHASONE SODIUM PHOSPHATE 4 MG: 4 INJECTION, SOLUTION INTRA-ARTICULAR; INTRALESIONAL; INTRAMUSCULAR; INTRAVENOUS; SOFT TISSUE at 09:01

## 2025-01-09 RX ADMIN — CEFAZOLIN 2 G: 2 INJECTION, POWDER, FOR SOLUTION INTRAMUSCULAR; INTRAVENOUS at 09:01

## 2025-01-09 RX ADMIN — ROCURONIUM BROMIDE 50 MG: 10 INJECTION, SOLUTION INTRAVENOUS at 09:01

## 2025-01-09 RX ADMIN — MIDAZOLAM HYDROCHLORIDE 2 MG: 1 INJECTION, SOLUTION INTRAMUSCULAR; INTRAVENOUS at 09:01

## 2025-01-09 RX ADMIN — PHENYLEPHRINE HYDROCHLORIDE 100 MCG: 10 INJECTION INTRAVENOUS at 10:01

## 2025-01-09 RX ADMIN — GABAPENTIN 300 MG: 300 CAPSULE ORAL at 07:01

## 2025-01-09 RX ADMIN — SODIUM CHLORIDE: 9 INJECTION, SOLUTION INTRAVENOUS at 09:01

## 2025-01-09 RX ADMIN — PROPOFOL 150 MG: 10 INJECTION, EMULSION INTRAVENOUS at 09:01

## 2025-01-09 RX ADMIN — ACETAMINOPHEN 1000 MG: 500 TABLET, FILM COATED ORAL at 07:01

## 2025-01-09 RX ADMIN — SUGAMMADEX 200 MG: 100 INJECTION, SOLUTION INTRAVENOUS at 10:01

## 2025-01-09 RX ADMIN — LIDOCAINE HYDROCHLORIDE 50 MG: 10 INJECTION, SOLUTION EPIDURAL; INFILTRATION; INTRACAUDAL; PERINEURAL at 09:01

## 2025-01-09 RX ADMIN — ENOXAPARIN SODIUM 40 MG: 40 INJECTION SUBCUTANEOUS at 07:01

## 2025-01-09 NOTE — OP NOTE
PATIENT:  Jose Sarmiento      : 1960       DATE OF SURGERY:   2025          SURGEON:  Tony Jimenez MD       ASSISTANT:  Dasia Tam NP (First Assistant)          PREOPERATIVE DIAGNOSIS:  Left Inguinal Hernia           POST OPERATIVE DIAGNOSIS:  Same.           PROCEDURE:  Open Left Inguinal Hernia           ANESTHESIA:  General endotracheal anesthesia.           ESTIMATED BLOOD LOSS:  Approximately 20 cc.   Blood administered, none.           LAP AND INSTRUMENT COUNT:  Correct X2 at the end of the case.           SPECIMENS:  Hernia Sac          INDICATION AND SIGNIFICANT HISTORY:  Patient is a 64 y.o.-year-old male complaining of buldge in his right groin for the past few weeks.  Patient was worked up clincally and found to have a right inguinal hernia.  Risks and benefits of surgery was discussed with the patient who voiced understanding of risks / benefits and elected to proceed with surgery.                   PROCEDURE IN DETAIL:  Once informed consents were obtained, the patient was taken to the OR and placed supine on the OR table.  After general endotracheal anesthesia was induced the abdomen was prepped and draped in the standard surgical fashion.  An incision was made in line with the inguinal ligament in the Left lower quadrant.  Dissection was carried out through Destinee's fascia to the level of the external oblique aponeurosis.  This was then opened in the line of its fibers sharply and retracted laterally.  The cord and cord structures were dissected off the pelvic floor and wrapped with a Penrose drain for retraction.  The ilioinguinal nerve was dissected off the cord and retracted out of the operative area.  Attention was then redirected to the anterior medial portion of the sac which was dissected and the hernia sac was found.  The patient had a large inguinal hernia sac with chronic scarring to all surrounding tissue.  Care was taken to slowly and easily dissect all the  surrounding structures including the vas deferens off the hernia sac from the cord.  The hernia sac was then opened and no abdominal contents were visualized.  The hernia defect was then closed and high ligated with a 2-0 silk suture followed by a silk simple tie.  The wound was then copiously irrigated and dried.  Due to a moderate degree of laxity in the pelvic floor, a piece of pro- mesh was placed, secured to the pubic tubercle and inguinal ligament laterally, and shelving border medially.  External oblique aponeurosis was closed with a 2-0 Vicryl suture in a running fashion.  Destinee's fascia was closed with 3-0 Vicryl suture in a running subcuticular fashion.  The skin was then closed with a 4-0 Vicryl suture in a subcuticular fashion.  The skin was clean and dry and a dry sterile dressing was placed on the wound.  Lap and instrument counts were correct times two at the end of the case. Dasia Tam was present during the entirety of the case and was critical in retraction for proper dissection.  There was no qualified resident available for assisting during this procedure.  The patient tolerated the procedure well and was transported to the Recovery Room in good condition.

## 2025-01-09 NOTE — ANESTHESIA PROCEDURE NOTES
Intubation    Date/Time: 1/9/2025 9:38 AM    Performed by: Radha Schuster CRNA  Authorized by: Luis Armando Escobar MD    Intubation:     Induction:  Intravenous    Intubated:  Postinduction    Mask Ventilation:  Easy mask    Attempts:  1    Attempted By:  CRNA    Method of Intubation:  Direct    Blade:  Saba 2    Laryngeal View Grade: Grade I - full view of cords      Difficult Airway Encountered?: No      Complications:  None    Airway Device:  Oral endotracheal tube    Airway Device Size:  7.5    Style/Cuff Inflation:  Cuffed (inflated to minimal occlusive pressure)    Inflation Amount (mL):  3    Tube secured:  21    Secured at:  The lips    Placement Verified By:  Capnometry    Complicating Factors:  None    Findings Post-Intubation:  BS equal bilateral and atraumatic/condition of teeth unchanged

## 2025-01-09 NOTE — ANESTHESIA PREPROCEDURE EVALUATION
01/08/2025  Jose Sarmiento is a 64 y.o., male, who presents for the following:    Procedure: REPAIR, HERNIA, INGUINAL  ////Open Left Inguinal Hernia Repair (Left) - Open Left Inguinal Hernia Repair   Anesthesia type: General   Diagnosis: Left inguinal hernia [K40.90]   Pre-op diagnosis: Left inguinal hernia [K40.90]   Location: [address] [place] [address] OR   Surgeons: Tony Jimenez Jr., MD     Past Medical History:   Diagnosis Date    HLD (hyperlipidemia)     Left inguinal hernia     Prostate cancer          LAB: reviewed    EKG:  NSR    Pre-op Assessment    I have reviewed the Patient Summary Reports.     I have reviewed the Nursing Notes. I have reviewed the NPO Status.   I have reviewed the Medications.     Review of Systems  Anesthesia Hx:  No problems with previous Anesthesia             Denies Family Hx of Anesthesia complications.    Denies Personal Hx of Anesthesia complications.                    Cardiovascular:                hyperlipidemia                               Pulmonary:  Pulmonary Normal                       Endocrine:  Endocrine Normal                Physical Exam  General: Alert and Oriented    Airway:  Mallampati: II   Mouth Opening: Normal  TM Distance: 4 - 6 cm  Tongue: Normal  Neck ROM: Normal ROM    Dental:  Intact    Chest/Lungs:  Normal Respiratory Rate    Heart:  Rate: Normal  Rhythm: Regular Rhythm        Anesthesia Plan  Type of Anesthesia, risks & benefits discussed:    Anesthesia Type: Gen ETT  Intra-op Monitoring Plan: Standard ASA Monitors  Post Op Pain Control Plan: multimodal analgesia and IV/PO Opioids PRN  Induction:  IV  Airway Plan: Direct, Post-Induction  Informed Consent: Informed consent signed with the Patient and all parties understand the risks and agree with anesthesia plan.  All questions answered. Patient consented to blood products? No  ASA  Score: 2  Day of Surgery Review of History & Physical: H&P Update referred to the surgeon/provider.  Anesthesia Plan Notes: Premedication: Midazolam  Special Technique: Preemptive analgesia with Neurontin, zofran, acetaminophen and celebrex l  PONV prophylaxis with intraop zofran, decadron     Ready For Surgery From Anesthesia Perspective.     .

## 2025-01-09 NOTE — TRANSFER OF CARE
"Anesthesia Transfer of Care Note    Patient: Jose Sarmiento    Procedure(s) Performed: Procedure(s) (LRB):  REPAIR, HERNIA, INGUINAL  ////Open Left Inguinal Hernia Repair (Left)    Patient location: PACU    Anesthesia Type: general    Transport from OR: Transported from OR on room air with adequate spontaneous ventilation    Post pain: adequate analgesia    Post assessment: no apparent anesthetic complications    Post vital signs: stable    Level of consciousness: responds to stimulation    Nausea/Vomiting: no nausea/vomiting    Complications: none    Transfer of care protocol was followed      Last vitals: Visit Vitals  /84   Pulse 97   Temp 36.6 °C (97.8 °F) (Oral)   Ht 5' 11" (1.803 m)   Wt 83.9 kg (185 lb)   SpO2 98%   BMI 25.80 kg/m²     "

## 2025-01-09 NOTE — DISCHARGE INSTRUCTIONS
General Surgery, Care After    Regular diet    No heavy lifting greater than 20 pounds for 4 weeks    No driving for 3 days or as long as taking narcotic pain medication    Leave dressings clean, dry, and in place.  You may remove dressings after 48 hours (remove transparent dressing and gauze; steri-strips remain in place for 5-7 days post-op).    Once dressings are removed, wash incisions daily with antibacterial soap and water, pat dry.    No tub bath/soaking for 2 weeks    Take medications as prescribed  First dose of OTC Tylenol or Rx Norco, if needed for discomfort, may be taken at 1:40 pm.  Take Norco with food to prevent nausea.  Okay to take NSAIDs (Ibuprofen, Advil, Aleve, etc) for discomfort.  First dose may be taken at any time.    Ok to take over the counter stool softeners as directed for post-op constipation.    Ok to take gas relieving medications for gas cramps.    Notify MD for excessive pain, swelling, fever greater than 101F, and any other concerns

## 2025-01-09 NOTE — ANESTHESIA POSTPROCEDURE EVALUATION
Anesthesia Post Evaluation    Patient: Jose Sarmiento    Procedure(s) Performed: Procedure(s) (LRB):  REPAIR, HERNIA, INGUINAL  ////Open Left Inguinal Hernia Repair (Left)    Final Anesthesia Type: general      Patient location during evaluation: PACU  Patient participation: Yes- Able to Participate  Level of consciousness: oriented and sedated  Post-procedure vital signs: reviewed and stable  Pain management: adequate  Airway patency: patent    PONV status at discharge: No PONV  Anesthetic complications: no      Cardiovascular status: stable and hemodynamically stable  Respiratory status: spontaneous ventilation and unassisted  Hydration status: euvolemic  Follow-up not needed.  Comments: [facility]              Vitals Value Taken Time   /62 01/09/25 1236   Temp 36.1 °C (97 °F) 01/09/25 1048   Pulse 77 01/09/25 1236   Resp 12 01/09/25 1048   SpO2 97 % 01/09/25 1156         Event Time   Out of Recovery 11:35:00         Pain/Feng Score: Pain Rating Prior to Med Admin: 0 (1/9/2025  7:38 AM)  Feng Score: 7 (1/9/2025 11:05 AM)

## 2025-01-09 NOTE — H&P
"HISTORY & PHYSICAL  General Surgery    Patient Name: Jose Sarmiento  YOB: 1960    Date: 01/09/2025                     PRESENTING HISTORY     Chief Complaint/Reason for Admission: "hernia"    History of Present Illness:  Mr. Jose Sarmiento is a 64 y.o. male  who reports he started noticing a bulge in his left groin.  It occasionally causes some pain and discomfort. He has a history of strenuous activity/lifting in the past.  Denies any changes to bowel / bladder habits. He denies CP/SOB or fever/chills.      Previous abdominal surgeries: Laparoscopic converted to open Cholecystectomy, Robot assisted Radical Prostatectomy with bilateral pelvic lymph node dissection    Review of Systems:  12 point ROS negative except as stated in HPI    PAST HISTORY:     Past Medical History:   Diagnosis Date    HLD (hyperlipidemia)     Left inguinal hernia     Prostate cancer        Past Surgical History:   Procedure Laterality Date    CHOLECYSTECTOMY  2020    Dr. Jimenez    COLONOSCOPY  08/20/2019    LAPAROSCOPY      PROSTATE BIOPSY      PROSTATECTOMY      TONSILLECTOMY         Family History   Problem Relation Name Age of Onset    Hypertension Father      Hyperlipidemia Father      Hypertension Sister      Cancer Brother          Prostate    Hypertension Brother         Social History     Socioeconomic History    Marital status:    Tobacco Use    Smoking status: Never    Smokeless tobacco: Never   Substance and Sexual Activity    Alcohol use: Yes     Comment: Social drinker    Drug use: Never       MEDICATIONS & ALLERGIES:     No current facility-administered medications on file prior to encounter.     Current Outpatient Medications on File Prior to Encounter   Medication Sig    cholecalciferol, vitamin D3, 125 mcg (5,000 unit) Tab Take 1 tablet by mouth once daily.    cyanocobalamin (VITAMIN B-12) 1000 MCG tablet Take 100 mcg by mouth once daily.    folic acid/multivit-min/lutein (CENTRUM SILVER ORAL) Take " by mouth.    rosuvastatin (CRESTOR) 20 MG tablet Take 20 mg by mouth.       Review of patient's allergies indicates:  No Known Allergies    OBJECTIVE:     Vital Signs:  Temp:  [97.8 °F (36.6 °C)] 97.8 °F (36.6 °C)  Pulse:  [97] 97  SpO2:  [98 %] 98 %  BP: (128)/(84) 128/84  Body mass index is 25.8 kg/m².     Physical Exam:  General:  Well developed, well nourished, no acute distress  HEENT:  Normocephalic, atraumatic, PERRL, EOMI, clear sclera, ears normal, neck supple, throat clear without erythema or exudates  CVS:  RRR, S1 and S2 normal, no murmurs, rubs, gallops  Resp:  Lungs clear to auscultation, no wheezes, rales, rhonchi, cough  GI:  Abdomen soft, non-tender, non-distended, normoactive bowel sounds, no masses  :  R groin - no hernia appreciated, nttp, descended testicle  L groin - Reducible Large Left Inguinal Hernia, nttp, descended testicle  MSK:  No muscle atrophy, cyanosis, peripheral edema, full range of motion  Skin:  No rashes, ulcers, erythema  Neuro:  CNII-XII grossly intact  Psych:  Alert and oriented to person, place, and time          ASSESSMENT & PLAN:   Mr. Jose Sarmiento is a 64 y.o. male with left inguinal hernia    Plan:  - Open Left Inguinal Hernia  - Preop workup        Dasia Dixonleur  General Surgery    1/9/2025  7:28 AM

## 2025-01-09 NOTE — DISCHARGE SUMMARY
[facility] Surgical - Periop Services 2nd Floor  Discharge Note  Short Stay    Procedure(s) (LRB):  REPAIR, HERNIA, INGUINAL  ////Open Left Inguinal Hernia Repair (Left)      OUTCOME: Patient tolerated treatment/procedure well without complication and is now ready for discharge.    DISPOSITION: Home or Self Care    FINAL DIAGNOSIS:  Left inguinal hernia    FOLLOWUP: In clinic    DISCHARGE INSTRUCTIONS:  No discharge procedures on file.     TIME SPENT ON DISCHARGE:      minutes

## 2025-01-10 VITALS
WEIGHT: 185 LBS | SYSTOLIC BLOOD PRESSURE: 113 MMHG | DIASTOLIC BLOOD PRESSURE: 62 MMHG | HEART RATE: 77 BPM | BODY MASS INDEX: 25.9 KG/M2 | RESPIRATION RATE: 12 BRPM | TEMPERATURE: 97 F | HEIGHT: 71 IN | OXYGEN SATURATION: 97 %

## 2025-01-10 LAB — PSYCHE PATHOLOGY RESULT: NORMAL

## 2025-01-13 ENCOUNTER — TELEPHONE (OUTPATIENT)
Dept: SURGERY | Facility: CLINIC | Age: 65
End: 2025-01-13
Payer: COMMERCIAL

## 2025-01-13 NOTE — TELEPHONE ENCOUNTER
----- Message from Med Assistant Renetta sent at 2025 11:28 AM CST -----  Name....: Sherice Ayersoine  Phone #.: [phone]  Patient.: Jose Ayersoine  Pt .: 1960  Primary.: Dr. Burns  Details.: Adv that his testicles are black and blue and wanting to know if that is normal.       Patient Type.....: General Surgery Patient Caller Id.:[phone]    I believe this is yall pt looks like he recently had sx  with Dr burns

## 2025-01-23 NOTE — PROGRESS NOTES
Post Operative Progress Note  General Surgery    Patient Name: Jose Sarmiento  YOB: 1960    Date: 01/27/2025                   SUBJECTIVE:     Chief Complaint/Reason for Admission:   Chief Complaint   Patient presents with    Post-op Evaluation     1/9/25 Open Left Inguinal Hernia Repair          History of Present Illness:  Mr. Jose Sarmiento is a 64 y.o. male s/p Open Left inguinal hernia repair. male without any complaints. Tolerating a regular diet without changes to bowel / bladder habits. Denies fever / chills.    Review of Systems:  12 point ROS negative except as stated in HPI    PAST HISTORY:     Past Medical History:   Diagnosis Date    HLD (hyperlipidemia)     Left inguinal hernia     Prostate cancer      Past Surgical History:   Procedure Laterality Date    CHOLECYSTECTOMY  2020    Dr. Jimenez    COLONOSCOPY  08/20/2019    LAPAROSCOPY      PROSTATE BIOPSY      PROSTATECTOMY      REPAIR, HERNIA, INGUINAL Left 1/9/2025    Procedure: REPAIR, HERNIA, INGUINAL  ////Open Left Inguinal Hernia Repair;  Surgeon: Tnoy Jimenez Jr., MD;  Location: [address];  Service: General;  Laterality: Left;  Open Left Inguinal Hernia Repair    TONSILLECTOMY       Family History   Problem Relation Name Age of Onset    Hypertension Father      Hyperlipidemia Father      Hypertension Sister      Cancer Brother          Prostate    Hypertension Brother       Social History     Socioeconomic History    Marital status:    Tobacco Use    Smoking status: Never    Smokeless tobacco: Never   Substance and Sexual Activity    Alcohol use: Yes     Comment: Social drinker    Drug use: Never     Social Drivers of Health     Financial Resource Strain: Patient Declined (1/15/2025)    Overall Financial Resource Strain (CARDIA)     Difficulty of Paying Living Expenses: Patient declined   Food Insecurity: Patient Declined (1/15/2025)    Hunger Vital Sign     Worried About Running Out of Food in the Last Year:  "Patient declined     Ran Out of Food in the Last Year: Patient declined   Physical Activity: Insufficiently Active (1/15/2025)    Exercise Vital Sign     Days of Exercise per Week: 3 days     Minutes of Exercise per Session: 10 min   Stress: No Stress Concern Present (1/15/2025)    [employer] [place] [employer] - Occupational Stress Questionnaire     Feeling of Stress : Not at all   Housing Stability: Unknown (1/15/2025)    Housing Stability Vital Sign     Unable to Pay for Housing in the Last Year: Patient declined       MEDICATIONS & ALLERGIES:     Current Outpatient Medications on File Prior to Visit   Medication Sig    cholecalciferol, vitamin D3, 125 mcg (5,000 unit) Tab Take 1 tablet by mouth once daily.    cyanocobalamin (VITAMIN B-12) 1000 MCG tablet Take 100 mcg by mouth once daily.    folic acid/multivit-min/lutein (CENTRUM SILVER ORAL) Take by mouth.    HYDROcodone-acetaminophen (NORCO) 7.5-325 mg per tablet Take 1 tablet by mouth every 6 (six) hours as needed for Pain.    rosuvastatin (CRESTOR) 20 MG tablet Take 20 mg by mouth.     No current facility-administered medications on file prior to visit.     Review of patient's allergies indicates:  No Known Allergies    OBJECTIVE:     Vitals:    01/27/25 1118   BP: 112/74   Pulse: 72   Weight: 83.9 kg (184 lb 15.5 oz)   Height: 5' 11" (1.803 m)     Body mass index is 25.8 kg/m².    Physical Exam:  General:  Well developed, well nourished, no acute distress  HEENT:  Normocephalic, atraumatic, PERRL, EOMI, clear sclera, ears normal, neck supple, throat clear without erythema or exudates  CVS:  RRR, S1 and S2 normal, no murmurs, rubs, gallops  Resp:  Lungs clear to auscultation, no wheezes, rales, rhonchi, cough  GI:  Abdomen soft, non-tender, non-distended, normoactive bowel sounds, no masses  :  Deferred  MSK:  No muscle atrophy, cyanosis, peripheral edema, full range of motion  Skin:  No rashes, ulcers, erythema. Incision c/d/i  Neuro:  CNII-XII " grossly intact  Psych:  Alert and oriented to person, place, and time        VISIT DIAGNOSES:       ICD-10-CM ICD-9-CM   1. Post-operative state  Z98.890 V45.89       ASSESSMENT/PLAN:   Mr. Jose Sarmiento is a 64 y.o. male s/p Open Left inguinal hernia repair    - doing well  - no heavy lifting >20lbs x 2 additional weeks    RTC PRN

## 2025-01-27 ENCOUNTER — OFFICE VISIT (OUTPATIENT)
Dept: SURGERY | Facility: CLINIC | Age: 65
End: 2025-01-27
Payer: COMMERCIAL

## 2025-01-27 VITALS
HEIGHT: 71 IN | WEIGHT: 184.94 LBS | SYSTOLIC BLOOD PRESSURE: 112 MMHG | HEART RATE: 72 BPM | DIASTOLIC BLOOD PRESSURE: 74 MMHG | BODY MASS INDEX: 25.89 KG/M2

## 2025-01-27 DIAGNOSIS — Z98.890 POST-OPERATIVE STATE: Primary | ICD-10-CM

## 2025-01-27 PROCEDURE — 1160F RVW MEDS BY RX/DR IN RCRD: CPT | Mod: CPTII,,,

## 2025-01-27 PROCEDURE — 99024 POSTOP FOLLOW-UP VISIT: CPT | Mod: ,,,

## 2025-01-27 PROCEDURE — 1159F MED LIST DOCD IN RCRD: CPT | Mod: CPTII,,,

## 2025-01-27 PROCEDURE — 3078F DIAST BP <80 MM HG: CPT | Mod: CPTII,,,

## 2025-01-27 PROCEDURE — 3074F SYST BP LT 130 MM HG: CPT | Mod: CPTII,,,

## 2025-03-31 NOTE — PROGRESS NOTES
Subjective:        PATIENT: Jose Sarmiento  MRN: [id number]  DATE: 4/2/2025  Chief Complaint: 6m f/u    Diagnosis: Thrombocytopenia    Clinical History:  Patient reports for follow up for thrombocytopenia with his wife.  He follows Urology Dr. Vergara for hx prostate carcinoma; next appointment is 12/2/2024.    Has not had surgery yet  Only new symptom in foot burring at night one side  No other symptoms-- was told to treat like plantar with ice massage and a ball.  He was not started that yet either.  Denies any other neuropathic symptoms.  No bleeding no bruising.      04/02/2025  Patient presents for follow up for his thrombocytopenia. His platelets were 211 a couple of months ago, today they dropped to 86k. He recently had hernia surgery with no bleeding complications. He denies abdominal pain, spleen not palpable on exam. He denies weight loss and night sweats.   Past Medical History:   Diagnosis Date    HLD (hyperlipidemia)     Left inguinal hernia     Prostate cancer       .  Past Surgical History:   Procedure Laterality Date    CHOLECYSTECTOMY  2020    Dr. Jimenez    COLONOSCOPY  08/20/2019    HERNIA REPAIR  1/2025    LAPAROSCOPY      PROSTATE BIOPSY      PROSTATECTOMY      REPAIR, HERNIA, INGUINAL Left 01/09/2025    Procedure: REPAIR, HERNIA, INGUINAL  ////Open Left Inguinal Hernia Repair;  Surgeon: Tony Jimenez Jr., MD;  Location: [address];  Service: General;  Laterality: Left;  Open Left Inguinal Hernia Repair    TONSILLECTOMY        .  Family History   Problem Relation Name Age of Onset    Hypertension Father Shivam     Hyperlipidemia Father Shivam     Hypertension Sister      Cancer Brother Shivam         Prostate    Hypertension Brother Shivam       Social History     Socioeconomic History    Marital status:    Tobacco Use    Smoking status: Never    Smokeless tobacco: Never   Substance and Sexual Activity    Alcohol use: Yes     Comment: Social drinker    Drug use: Never      Social Drivers of Health     Financial Resource Strain: Patient Declined (1/15/2025)    Overall Financial Resource Strain (CARDIA)     Difficulty of Paying Living Expenses: Patient declined   Food Insecurity: Patient Declined (1/15/2025)    Hunger Vital Sign     Worried About Running Out of Food in the Last Year: Patient declined     Ran Out of Food in the Last Year: Patient declined   Physical Activity: Insufficiently Active (1/15/2025)    Exercise Vital Sign     Days of Exercise per Week: 3 days     Minutes of Exercise per Session: 10 min   Stress: No Stress Concern Present (1/15/2025)    [employer] [place] [employer] - Occupational Stress Questionnaire     Feeling of Stress : Not at all   Housing Stability: Unknown (1/15/2025)    Housing Stability Vital Sign     Unable to Pay for Housing in the Last Year: Patient declined      .Review of patient's allergies indicates:  No Known Allergies     Current Outpatient Medications:     cholecalciferol, vitamin D3, 125 mcg (5,000 unit) Tab, Take 1 tablet by mouth once daily., Disp: , Rfl:     cyanocobalamin (VITAMIN B-12) 1000 MCG tablet, Take 100 mcg by mouth once daily., Disp: , Rfl:     folic acid/multivit-min/lutein (CENTRUM SILVER ORAL), Take by mouth., Disp: , Rfl:     rosuvastatin (CRESTOR) 20 MG tablet, Take 20 mg by mouth., Disp: , Rfl:    Review of Systems     See HPI  Objective:     Vitals:    04/02/25 1432   BP: 116/76   Pulse: 75   Resp: 20   Temp: 98.8 °F (37.1 °C)              Physical Exam  Vitals reviewed.   Constitutional:       Appearance: Normal appearance.   HENT:      Head: Normocephalic and atraumatic.      Mouth/Throat:      Mouth: Mucous membranes are moist.      Pharynx: Oropharynx is clear.   Eyes:      Extraocular Movements: Extraocular movements intact.      Conjunctiva/sclera: Conjunctivae normal.      Pupils: Pupils are equal, round, and reactive to light.   Cardiovascular:      Rate and Rhythm: Normal rate and regular rhythm.       Pulses: Normal pulses.      Heart sounds: Normal heart sounds.   Pulmonary:      Effort: Pulmonary effort is normal.      Breath sounds: Normal breath sounds.   Abdominal:      General: Abdomen is flat. Bowel sounds are normal. There is no distension.      Palpations: Abdomen is soft.   Musculoskeletal:         General: Normal range of motion.      Cervical back: Normal range of motion and neck supple.   Skin:     General: Skin is warm and dry.   Neurological:      General: No focal deficit present.      Mental Status: He is alert and oriented to person, place, and time. Mental status is at baseline.   Psychiatric:         Attention and Perception: Attention normal.         Mood and Affect: Mood normal.         Speech: Speech normal.         Behavior: Behavior normal.         Thought Content: Thought content normal.         Judgment: Judgment normal.         ECOG SCORE           Lab Review:    Assessment/Plan:   History of prostate carcinoma   Mild thrombocytopenia     This patient has had extensive workup for his thrombocytopenia including platelet antibodies which were negative, B12, folate, and methylmalonic acid. In review of peripheral blood smear, there is a question of proliferation disorder.  He had a JAK2 mutation that was negative.  His platelet count improved from 82,000 it is low up to 100,000 where it has been.  He has no sequelae related to the above.      With his platelets greater than 75,000 he is at low risk for bleeding complications. Will get   nuclear medicine liver-spleen scan,     Will check B1, B6, copper  and  repeat b12, folate  and if they stay lower then consider bone marrow biopsy.  He still at risk for myelodysplasia.  His PSA has been well-controlled continues to follow urology as well---as his platelet count really dipped since 2019 after he had his gallbladder surgery and his prostate cancer treatment    Orders Placed This Encounter    LIVER/SPLEEN SCAN        Follow up in about  3 weeks (around 4/23/2025) for Repeat CBC and OV with Dr. Finney to discuss scan.

## 2025-04-02 ENCOUNTER — RESULTS FOLLOW-UP (OUTPATIENT)
Dept: HEMATOLOGY/ONCOLOGY | Facility: CLINIC | Age: 65
End: 2025-04-02

## 2025-04-02 ENCOUNTER — LAB VISIT (OUTPATIENT)
Dept: LAB | Facility: HOSPITAL | Age: 65
End: 2025-04-02
Attending: INTERNAL MEDICINE
Payer: COMMERCIAL

## 2025-04-02 ENCOUNTER — OFFICE VISIT (OUTPATIENT)
Dept: HEMATOLOGY/ONCOLOGY | Facility: CLINIC | Age: 65
End: 2025-04-02
Payer: COMMERCIAL

## 2025-04-02 VITALS
BODY MASS INDEX: 26.55 KG/M2 | DIASTOLIC BLOOD PRESSURE: 76 MMHG | WEIGHT: 189.63 LBS | HEART RATE: 75 BPM | SYSTOLIC BLOOD PRESSURE: 116 MMHG | HEIGHT: 71 IN | RESPIRATION RATE: 20 BRPM | OXYGEN SATURATION: 96 % | TEMPERATURE: 99 F

## 2025-04-02 DIAGNOSIS — D69.6 THROMBOCYTOPENIC DISORDER: ICD-10-CM

## 2025-04-02 DIAGNOSIS — Z85.46 HISTORY OF PROSTATE CANCER: ICD-10-CM

## 2025-04-02 DIAGNOSIS — D69.6 THROMBOCYTOPENIC DISORDER: Primary | ICD-10-CM

## 2025-04-02 LAB
ADDITIONAL FINDINGS (OHS): ABNORMAL
BASOPHILS # BLD AUTO: 0.02 X10(3)/MCL
BASOPHILS NFR BLD AUTO: 0.4 %
EOSINOPHIL # BLD AUTO: 0.08 X10(3)/MCL (ref 0–0.9)
EOSINOPHIL NFR BLD AUTO: 1.6 %
ERYTHROCYTE [DISTWIDTH] IN BLOOD BY AUTOMATED COUNT: 13 % (ref 11.5–17)
GIANT PLATELETS: ABNORMAL
HCT VFR BLD AUTO: 46 % (ref 42–52)
HGB BLD-MCNC: 15.5 G/DL (ref 14–18)
IMM GRANULOCYTES # BLD AUTO: 0.12 X10(3)/MCL (ref 0–0.04)
IMM GRANULOCYTES NFR BLD AUTO: 2.4 %
LYMPHOCYTES # BLD AUTO: 1.67 X10(3)/MCL (ref 0.6–4.6)
LYMPHOCYTES NFR BLD AUTO: 33.7 %
MCH RBC QN AUTO: 32.3 PG (ref 27–31)
MCHC RBC AUTO-ENTMCNC: 33.7 G/DL (ref 33–36)
MCV RBC AUTO: 95.8 FL (ref 80–94)
MONOCYTES # BLD AUTO: 0.79 X10(3)/MCL (ref 0.1–1.3)
MONOCYTES NFR BLD AUTO: 15.9 %
NEUTROPHILS # BLD AUTO: 2.28 X10(3)/MCL (ref 2.1–9.2)
NEUTROPHILS NFR BLD AUTO: 46 %
PLATELET # BLD AUTO: 86 X10(3)/MCL (ref 130–400)
PLATELET # BLD EST: ABNORMAL 10*3/UL
PMV BLD AUTO: 9.9 FL (ref 7.4–10.4)
RBC # BLD AUTO: 4.8 X10(6)/MCL (ref 4.7–6.1)
RBC MORPH BLD: NORMAL
WBC # BLD AUTO: 4.96 X10(3)/MCL (ref 4.5–11.5)

## 2025-04-02 PROCEDURE — 99213 OFFICE O/P EST LOW 20 MIN: CPT | Mod: S$GLB,,, | Performed by: NURSE PRACTITIONER

## 2025-04-02 PROCEDURE — 1159F MED LIST DOCD IN RCRD: CPT | Mod: CPTII,S$GLB,, | Performed by: NURSE PRACTITIONER

## 2025-04-02 PROCEDURE — 3074F SYST BP LT 130 MM HG: CPT | Mod: CPTII,S$GLB,, | Performed by: NURSE PRACTITIONER

## 2025-04-02 PROCEDURE — 85025 COMPLETE CBC W/AUTO DIFF WBC: CPT

## 2025-04-02 PROCEDURE — 99999 PR PBB SHADOW E&M-EST. PATIENT-LVL IV: CPT | Mod: PBBFAC,,, | Performed by: NURSE PRACTITIONER

## 2025-04-02 PROCEDURE — 3078F DIAST BP <80 MM HG: CPT | Mod: CPTII,S$GLB,, | Performed by: NURSE PRACTITIONER

## 2025-04-02 PROCEDURE — 36415 COLL VENOUS BLD VENIPUNCTURE: CPT

## 2025-04-02 PROCEDURE — 1160F RVW MEDS BY RX/DR IN RCRD: CPT | Mod: CPTII,S$GLB,, | Performed by: NURSE PRACTITIONER

## 2025-04-02 PROCEDURE — 3008F BODY MASS INDEX DOCD: CPT | Mod: CPTII,S$GLB,, | Performed by: NURSE PRACTITIONER

## 2025-04-22 ENCOUNTER — HOSPITAL ENCOUNTER (OUTPATIENT)
Dept: RADIOLOGY | Facility: HOSPITAL | Age: 65
Discharge: HOME | End: 2025-04-22
Attending: NURSE PRACTITIONER
Payer: COMMERCIAL

## 2025-04-22 DIAGNOSIS — D69.6 THROMBOCYTOPENIC DISORDER: ICD-10-CM

## 2025-04-22 PROCEDURE — A9541 TC99M SULFUR COLLOID: HCPCS | Performed by: NURSE PRACTITIONER

## 2025-04-22 PROCEDURE — 78216 LVR&SPLEEN IMG W/VASC FLOW: CPT | Mod: TC

## 2025-04-22 RX ORDER — TECHNETIUM TC 99M SULFUR COLLOID 2 MG
4.7 KIT MISCELLANEOUS
Status: COMPLETED | OUTPATIENT
Start: 2025-04-22 | End: 2025-04-22

## 2025-04-22 RX ADMIN — TECHNETIUM TC 99M SULFUR COLLOID 4.7 MILLICURIE: KIT at 08:04

## 2025-04-28 DIAGNOSIS — D69.6 THROMBOCYTOPENIC DISORDER: Primary | ICD-10-CM

## 2025-04-28 DIAGNOSIS — Z85.46 HISTORY OF PROSTATE CANCER: ICD-10-CM

## 2025-04-28 NOTE — PROGRESS NOTES
Subjective:        PATIENT: Jose Sarmiento  MRN: [id number]  DATE: 4/29/2025  Chief Complaint: 3wk f/u    Diagnosis: Thrombocytopenia    Clinical History:  Patient reports for follow up for thrombocytopenia with his wife.  He follows Urology Dr. Vergara for hx prostate carcinoma; next appointment is 12/2/2024.    Has not had surgery yet  Only new symptom in foot burring at night one side  No other symptoms-- was told to treat like plantar with ice massage and a ball.  He was not started that yet either.  Denies any other neuropathic symptoms.  No bleeding no bruising.      04/29/2025  Patient presents for follow up for his thrombocytopenia. His platelets are 85.  Denies abdominal pain, weight loss and night sweats.     4/22/24 liver/spleen scan - No abnormality identified.     Past Medical History:   Diagnosis Date    HLD (hyperlipidemia)     Left inguinal hernia     Prostate cancer       .  Past Surgical History:   Procedure Laterality Date    CHOLECYSTECTOMY  2020    Dr. Jimenez    COLONOSCOPY  08/20/2019    HERNIA REPAIR  1/2025    LAPAROSCOPY      PROSTATE BIOPSY      PROSTATECTOMY      REPAIR, HERNIA, INGUINAL Left 01/09/2025    Procedure: REPAIR, HERNIA, INGUINAL  ////Open Left Inguinal Hernia Repair;  Surgeon: Tony Jimenez Jr., MD;  Location: [address];  Service: General;  Laterality: Left;  Open Left Inguinal Hernia Repair    TONSILLECTOMY        .  Family History   Problem Relation Name Age of Onset    Hypertension Father Shivam     Hyperlipidemia Father Shivam     Hypertension Sister      Cancer Brother Shivam         Prostate    Hypertension Brother Shivam       Social History     Socioeconomic History    Marital status:    Tobacco Use    Smoking status: Never    Smokeless tobacco: Never   Substance and Sexual Activity    Alcohol use: Yes     Comment: Social drinker    Drug use: Never     Social Drivers of Health     Financial Resource Strain: Patient Declined (1/15/2025)    Overall  Financial Resource Strain (CARDIA)     Difficulty of Paying Living Expenses: Patient declined   Food Insecurity: Patient Declined (1/15/2025)    Hunger Vital Sign     Worried About Running Out of Food in the Last Year: Patient declined     Ran Out of Food in the Last Year: Patient declined   Physical Activity: Insufficiently Active (1/15/2025)    Exercise Vital Sign     Days of Exercise per Week: 3 days     Minutes of Exercise per Session: 10 min   Stress: No Stress Concern Present (1/15/2025)    [employer] [place] [employer] - Occupational Stress Questionnaire     Feeling of Stress : Not at all   Housing Stability: Unknown (1/15/2025)    Housing Stability Vital Sign     Unable to Pay for Housing in the Last Year: Patient declined      .Review of patient's allergies indicates:  No Known Allergies     Current Outpatient Medications:     cholecalciferol, vitamin D3, 125 mcg (5,000 unit) Tab, Take 1 tablet by mouth once daily., Disp: , Rfl:     cyanocobalamin (VITAMIN B-12) 1000 MCG tablet, Take 100 mcg by mouth once daily., Disp: , Rfl:     folic acid/multivit-min/lutein (CENTRUM SILVER ORAL), Take by mouth., Disp: , Rfl:     rosuvastatin (CRESTOR) 20 MG tablet, Take 20 mg by mouth., Disp: , Rfl:    Review of Systems   Constitutional:  Negative for appetite change and unexpected weight change.   HENT:  Negative for mouth sores.    Eyes:  Negative for visual disturbance.   Respiratory:  Negative for cough and shortness of breath.    Cardiovascular:  Negative for chest pain.   Gastrointestinal:  Negative for abdominal pain and diarrhea.   Genitourinary:  Negative for frequency.   Musculoskeletal:  Negative for back pain.   Skin:  Negative for rash.   Neurological:  Negative for headaches.   Hematological:  Negative for adenopathy.   Psychiatric/Behavioral:  The patient is not nervous/anxious.         See HPI  Objective:     Vitals:    04/29/25 0903   BP: 115/77   Pulse: 71   Resp: 18   Temp: 98.2 °F (36.8 °C)       Physical Exam  Vitals reviewed.   Constitutional:       Appearance: Normal appearance.   HENT:      Head: Normocephalic and atraumatic.      Mouth/Throat:      Mouth: Mucous membranes are moist.      Pharynx: Oropharynx is clear.   Eyes:      Extraocular Movements: Extraocular movements intact.      Conjunctiva/sclera: Conjunctivae normal.      Pupils: Pupils are equal, round, and reactive to light.   Cardiovascular:      Rate and Rhythm: Normal rate and regular rhythm.      Pulses: Normal pulses.      Heart sounds: Normal heart sounds.   Pulmonary:      Effort: Pulmonary effort is normal.      Breath sounds: Normal breath sounds.   Abdominal:      General: Abdomen is flat. Bowel sounds are normal. There is no distension.      Palpations: Abdomen is soft.   Musculoskeletal:         General: Normal range of motion.      Cervical back: Normal range of motion and neck supple.   Skin:     General: Skin is warm and dry.   Neurological:      General: No focal deficit present.      Mental Status: He is alert and oriented to person, place, and time. Mental status is at baseline.   Psychiatric:         Attention and Perception: Attention normal.         Mood and Affect: Mood normal.         Speech: Speech normal.         Behavior: Behavior normal.         Thought Content: Thought content normal.         Judgment: Judgment normal.         ECOG SCORE           Lab Review:  Reviewed in epic    Assessment/Plan:   History of prostate carcinoma   Mild thrombocytopenia     This patient has had extensive workup for his thrombocytopenia including platelet antibodies which were negative, B12, folate, and methylmalonic acid. In review of peripheral blood smear, there is a question of proliferation disorder.  He had a JAK2 mutation that was negative.  His platelet count improved from 82,000 it is low up to 100,000 where it has been.  He has no sequelae related to the above.          Okay for colonoscopy   Hold off vaccines until  after his bone marrow        Plan  bone marrow biopsy.    With fish MDS panel, TeleMed visit 3 weeks after  With his history of radiation therapy we will order a bone marrow biopsy    Orders Placed This Encounter    Place in Outpatient    Case Request Operating Room - [facility]: Biopsy-bone marrow      Follow up in about 7 weeks (around 6/17/2025) for telemed visit, With ME with bone marrow biopsy results.     Alberto Finney MD.

## 2025-04-29 ENCOUNTER — LAB VISIT (OUTPATIENT)
Dept: LAB | Facility: HOSPITAL | Age: 65
End: 2025-04-29
Attending: INTERNAL MEDICINE
Payer: COMMERCIAL

## 2025-04-29 ENCOUNTER — OFFICE VISIT (OUTPATIENT)
Dept: HEMATOLOGY/ONCOLOGY | Facility: CLINIC | Age: 65
End: 2025-04-29
Payer: COMMERCIAL

## 2025-04-29 VITALS
TEMPERATURE: 98 F | SYSTOLIC BLOOD PRESSURE: 115 MMHG | RESPIRATION RATE: 18 BRPM | BODY MASS INDEX: 26.97 KG/M2 | DIASTOLIC BLOOD PRESSURE: 77 MMHG | HEIGHT: 71 IN | WEIGHT: 192.63 LBS | OXYGEN SATURATION: 97 % | HEART RATE: 71 BPM

## 2025-04-29 DIAGNOSIS — D69.6 THROMBOCYTOPENIC DISORDER: ICD-10-CM

## 2025-04-29 DIAGNOSIS — Z85.46 HISTORY OF PROSTATE CANCER: ICD-10-CM

## 2025-04-29 DIAGNOSIS — D69.6 THROMBOCYTOPENIC DISORDER: Primary | ICD-10-CM

## 2025-04-29 LAB
ALBUMIN SERPL-MCNC: 3.9 G/DL (ref 3.4–4.8)
ALBUMIN/GLOB SERPL: 1.4 RATIO (ref 1.1–2)
ALP SERPL-CCNC: 78 UNIT/L (ref 40–150)
ALT SERPL-CCNC: 24 UNIT/L (ref 0–55)
ANION GAP SERPL CALC-SCNC: 5 MEQ/L
AST SERPL-CCNC: 21 UNIT/L (ref 11–45)
BASOPHILS # BLD AUTO: 0.02 X10(3)/MCL
BASOPHILS NFR BLD AUTO: 0.4 %
BILIRUB SERPL-MCNC: 0.7 MG/DL
BUN SERPL-MCNC: 17.7 MG/DL (ref 8.4–25.7)
CALCIUM SERPL-MCNC: 9.3 MG/DL (ref 8.8–10)
CHLORIDE SERPL-SCNC: 107 MMOL/L (ref 98–107)
CO2 SERPL-SCNC: 27 MMOL/L (ref 23–31)
CREAT SERPL-MCNC: 0.99 MG/DL (ref 0.72–1.25)
CREAT/UREA NIT SERPL: 18
EOSINOPHIL # BLD AUTO: 0.12 X10(3)/MCL (ref 0–0.9)
EOSINOPHIL NFR BLD AUTO: 2.2 %
ERYTHROCYTE [DISTWIDTH] IN BLOOD BY AUTOMATED COUNT: 12.8 % (ref 11.5–17)
GFR SERPLBLD CREATININE-BSD FMLA CKD-EPI: >60 ML/MIN/1.73/M2
GLOBULIN SER-MCNC: 2.8 GM/DL (ref 2.4–3.5)
GLUCOSE SERPL-MCNC: 97 MG/DL (ref 82–115)
HCT VFR BLD AUTO: 45.3 % (ref 42–52)
HGB BLD-MCNC: 15 G/DL (ref 14–18)
IMM GRANULOCYTES # BLD AUTO: 0.12 X10(3)/MCL (ref 0–0.04)
IMM GRANULOCYTES NFR BLD AUTO: 2.2 %
LYMPHOCYTES # BLD AUTO: 2.26 X10(3)/MCL (ref 0.6–4.6)
LYMPHOCYTES NFR BLD AUTO: 40.9 %
MCH RBC QN AUTO: 32.2 PG (ref 27–31)
MCHC RBC AUTO-ENTMCNC: 33.1 G/DL (ref 33–36)
MCV RBC AUTO: 97.2 FL (ref 80–94)
MONOCYTES # BLD AUTO: 0.76 X10(3)/MCL (ref 0.1–1.3)
MONOCYTES NFR BLD AUTO: 13.7 %
NEUTROPHILS # BLD AUTO: 2.25 X10(3)/MCL (ref 2.1–9.2)
NEUTROPHILS NFR BLD AUTO: 40.6 %
PLATELET # BLD AUTO: 85 X10(3)/MCL (ref 130–400)
PMV BLD AUTO: 9.8 FL (ref 7.4–10.4)
POTASSIUM SERPL-SCNC: 4.4 MMOL/L (ref 3.5–5.1)
PROT SERPL-MCNC: 6.7 GM/DL (ref 5.8–7.6)
RBC # BLD AUTO: 4.66 X10(6)/MCL (ref 4.7–6.1)
SODIUM SERPL-SCNC: 139 MMOL/L (ref 136–145)
WBC # BLD AUTO: 5.53 X10(3)/MCL (ref 4.5–11.5)

## 2025-04-29 PROCEDURE — 3078F DIAST BP <80 MM HG: CPT | Mod: CPTII,S$GLB,, | Performed by: INTERNAL MEDICINE

## 2025-04-29 PROCEDURE — 1160F RVW MEDS BY RX/DR IN RCRD: CPT | Mod: CPTII,S$GLB,, | Performed by: INTERNAL MEDICINE

## 2025-04-29 PROCEDURE — 3074F SYST BP LT 130 MM HG: CPT | Mod: CPTII,S$GLB,, | Performed by: INTERNAL MEDICINE

## 2025-04-29 PROCEDURE — 3008F BODY MASS INDEX DOCD: CPT | Mod: CPTII,S$GLB,, | Performed by: INTERNAL MEDICINE

## 2025-04-29 PROCEDURE — 85025 COMPLETE CBC W/AUTO DIFF WBC: CPT

## 2025-04-29 PROCEDURE — 80053 COMPREHEN METABOLIC PANEL: CPT

## 2025-04-29 PROCEDURE — 99999 PR PBB SHADOW E&M-EST. PATIENT-LVL IV: CPT | Mod: PBBFAC,,, | Performed by: INTERNAL MEDICINE

## 2025-04-29 PROCEDURE — 1159F MED LIST DOCD IN RCRD: CPT | Mod: CPTII,S$GLB,, | Performed by: INTERNAL MEDICINE

## 2025-04-29 PROCEDURE — 99214 OFFICE O/P EST MOD 30 MIN: CPT | Mod: S$GLB,,, | Performed by: INTERNAL MEDICINE

## 2025-05-08 ENCOUNTER — TELEPHONE (OUTPATIENT)
Dept: HEMATOLOGY/ONCOLOGY | Facility: CLINIC | Age: 65
End: 2025-05-08
Payer: COMMERCIAL

## 2025-05-08 NOTE — TELEPHONE ENCOUNTER
Patient's wife states he is currently being treated for URI with doxycycline and was also given prednisone. Scheduled to have bone marrow bx next Tuesday and would like to know if they can still go through with bx while taking these medications. Please advise.

## 2025-05-13 ENCOUNTER — HOSPITAL ENCOUNTER (OUTPATIENT)
Facility: HOSPITAL | Age: 65
Discharge: HOME | End: 2025-05-13
Attending: PATHOLOGY | Admitting: PATHOLOGY
Payer: COMMERCIAL

## 2025-05-13 DIAGNOSIS — D69.6 THROMBOCYTOPENIC DISORDER: ICD-10-CM

## 2025-05-13 LAB
BASOPHILS # BLD AUTO: 0.03 X10(3)/MCL
BASOPHILS NFR BLD AUTO: 0.5 %
EOSINOPHIL # BLD AUTO: 0.09 X10(3)/MCL (ref 0–0.9)
EOSINOPHIL NFR BLD AUTO: 1.6 %
ERYTHROCYTE [DISTWIDTH] IN BLOOD BY AUTOMATED COUNT: 12.8 % (ref 11.5–17)
HCT VFR BLD AUTO: 42 % (ref 42–52)
HGB BLD-MCNC: 14.3 G/DL (ref 14–18)
IMM GRANULOCYTES # BLD AUTO: 0.14 X10(3)/MCL (ref 0–0.04)
IMM GRANULOCYTES NFR BLD AUTO: 2.5 %
LYMPHOCYTES # BLD AUTO: 1.98 X10(3)/MCL (ref 0.6–4.6)
LYMPHOCYTES NFR BLD AUTO: 35.9 %
MCH RBC QN AUTO: 32.1 PG (ref 27–31)
MCHC RBC AUTO-ENTMCNC: 34 G/DL (ref 33–36)
MCV RBC AUTO: 94.2 FL (ref 80–94)
MONOCYTES # BLD AUTO: 0.98 X10(3)/MCL (ref 0.1–1.3)
MONOCYTES NFR BLD AUTO: 17.8 %
NEUTROPHILS # BLD AUTO: 2.29 X10(3)/MCL (ref 2.1–9.2)
NEUTROPHILS NFR BLD AUTO: 41.7 %
NRBC BLD AUTO-RTO: 0 %
PLATELET # BLD AUTO: 100 X10(3)/MCL (ref 130–400)
PLATELETS.RETICULATED NFR BLD AUTO: 2 % (ref 0.9–11.2)
PMV BLD AUTO: 9.5 FL (ref 7.4–10.4)
RBC # BLD AUTO: 4.46 X10(6)/MCL (ref 4.7–6.1)
WBC # BLD AUTO: 5.51 X10(3)/MCL (ref 4.5–11.5)

## 2025-05-13 PROCEDURE — 36415 COLL VENOUS BLD VENIPUNCTURE: CPT | Performed by: INTERNAL MEDICINE

## 2025-05-13 PROCEDURE — 63600175 PHARM REV CODE 636 W HCPCS: Performed by: PATHOLOGY

## 2025-05-13 PROCEDURE — 38222 DX BONE MARROW BX & ASPIR: CPT | Performed by: PATHOLOGY

## 2025-05-13 PROCEDURE — 85025 COMPLETE CBC W/AUTO DIFF WBC: CPT | Performed by: INTERNAL MEDICINE

## 2025-05-13 RX ORDER — PREDNISONE 20 MG/1
20 TABLET ORAL 2 TIMES DAILY
COMMUNITY
Start: 2025-05-08

## 2025-05-13 RX ORDER — DOXYCYCLINE HYCLATE 100 MG
100 TABLET ORAL 2 TIMES DAILY
COMMUNITY
Start: 2025-05-08

## 2025-05-13 RX ORDER — FLUMAZENIL 0.1 MG/ML
0.2 INJECTION INTRAVENOUS
Status: DISCONTINUED | OUTPATIENT
Start: 2025-05-13 | End: 2025-05-13 | Stop reason: HOSPADM

## 2025-05-13 RX ORDER — MIDAZOLAM HYDROCHLORIDE 2 MG/2ML
1 INJECTION, SOLUTION INTRAMUSCULAR; INTRAVENOUS
Status: DISCONTINUED | OUTPATIENT
Start: 2025-05-13 | End: 2025-05-13 | Stop reason: HOSPADM

## 2025-05-13 RX ORDER — SODIUM CHLORIDE 9 MG/ML
INJECTION, SOLUTION INTRAVENOUS CONTINUOUS
Status: DISCONTINUED | OUTPATIENT
Start: 2025-05-13 | End: 2025-05-13 | Stop reason: HOSPADM

## 2025-05-13 RX ADMIN — MIDAZOLAM HYDROCHLORIDE 1 MG: 1 INJECTION, SOLUTION INTRAMUSCULAR; INTRAVENOUS at 10:05

## 2025-05-13 RX ADMIN — MIDAZOLAM HYDROCHLORIDE 2 MG: 1 INJECTION, SOLUTION INTRAMUSCULAR; INTRAVENOUS at 10:05

## 2025-05-13 NOTE — PROCEDURES
"Jose Sarmiento is a 64 y.o. male patient.    Temp: 98.4 °F (36.9 °C) (05/13/25 0907)  Pulse: 79 (05/13/25 1030)  Resp: 19 (05/13/25 1025)  BP: 109/83 (05/13/25 1030)  SpO2: (!) 93 % (05/13/25 1030)  Weight: 83.8 kg (184 lb 11.9 oz) (05/13/25 0907)  Height: 5' 11" (180.3 cm) (05/13/25 0907)  Mallampati Scale: Class II  ASA Classification: Class 2    Bone marrow    Date/Time: 5/13/2025 11:04 AM    Performed by: Jasvir Andrea MD  Authorized by: Jasvir Andrea MD    Consent Done?: Yes (Written)   Immediately prior to procedure a time out was called to verify the correct patient, procedure, equipment, support staff and site/side marked as required.   Patient was prepped and draped in the usual sterile fashion.      Assistants?: Yes    List of assistants: Karyn Sandoval I was present for the entire procedure.    Position: left lateral  Anesthesia: local infiltration  Local anesthetic: lidocaine 1% without epinephrine  Aspiration?: Yes   Biopsy?: Yes    Specimen source: right posterior iliac crest  Number of Specimens: 2  Estimated blood loss (cc):12  Patient tolerated the procedure well with no immediate complications.  Post-operative instructions were provided for the patient.  Patient was discharged and will follow up if any complications occur.     The patient was sedated with 5mg Versed IV, administered in titrated doses.      5/13/2025    "

## 2025-05-13 NOTE — DISCHARGE INSTRUCTIONS
Bone Marrow Biopsy    -No driving for 24 hours.    -May remove band-aid tomorrow and shower. No tub baths/submerging in water until site is fully healed, about 7 days.     -A purple marking pen was used to jose the site prior to procedure, a rounded stain like smear around the dressing is normal.   -If bleeding occurs, hold firm pressure at the site for at least 5 minutes. Replace the dressing or bandaid, and re-time for another 24 hours before removal.     BLEEDING:Report to ER with any UNCONTROLLABLE bleeding, heavy increase bruising at site (growing in size), chest pain, shortness of breath, and/or SEVERE/SUDDEN unrelieved abdominal pain.    INFECTION:  watch for any signs or symptoms of infection such as chills, fever, redness or drainage at surgical site. Notify your doctor     PAIN: It is normal to experience some discomfort and/or soreness at the site of procedure. Apply an ice pack on and off every 15 minutes as needed. We recommend over the counter tylenol or ibuprofen if you are able to take it.      NAUSEA: Due to the sedative you may experience nausea for up to 24 hours. If nausea and vomiting last longer, contact your doctor.     - Biopsy results will be sent to your oncologist/referring physician.You should have a follow up appointment already scheduled for results.     - Call with any questions or concerns.

## 2025-05-14 VITALS
SYSTOLIC BLOOD PRESSURE: 102 MMHG | DIASTOLIC BLOOD PRESSURE: 67 MMHG | TEMPERATURE: 98 F | BODY MASS INDEX: 25.86 KG/M2 | RESPIRATION RATE: 16 BRPM | WEIGHT: 184.75 LBS | HEIGHT: 71 IN | HEART RATE: 70 BPM | OXYGEN SATURATION: 97 %

## 2025-06-09 NOTE — PROGRESS NOTES
Subjective:        PATIENT: Jose Sarmiento  MRN: [id number]  DATE: 6/16/2025  Chief Complaint: 7 wk f/u    Diagnosis:  Hairy cell leukemia    Clinical History:  Patient reports for follow up for thrombocytopenia --is seen 9/2023  He follows Urology Dr. Vergara for hx prostate carcinoma;   This patient has had extensive workup for his thrombocytopenia including platelet antibodies which were negative, B12, folate, and methylmalonic acid. In review of peripheral blood smear, there is a question of proliferation disorder. He had a JAK2 mutation that was negative. His platelet count improved from 82,000 at  is low up to 100,000 where it has been. 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      OVERALL BONE MARROW CELLULARITY OF 30%, WITH 80% INVOLVEMENT BY HAIRY CELL   LEUKEMIA, IN AN INTERSTITIAL PATTERN.      IMMUNOPHENOTYPE OF HAIRY CELL LEUKEMIA (BY FLOW CYTOMETRY AND   IMMUNOHISTOCHEMISTRY).      B CELL LINEAGE (CD19, CD20+), RESTRICTED TO KAPPA LIGHT CHAIN, WITH COEXPRESSION   OF  AND CD11C.      CYTOGENETICS: 46,XY      MPN (JAK2 V617F, CALR, MPL) REFLEX:  NEGATIVE.      FLOW CYTOMETRY: REVEALS LYMPHOCYTE POPULATION WITH ABOVE PHENOTYPE.             06/16/2025  Patient presents for follow up for his thrombocytopenia. His platelets are 100 on last check  In his here today for follow-up.  He denies any fever chills night sweats or weight loss.  He has a occasional bruising.    He has tolerated all of his procedures well.  No early satiety.  His platelet count has waxed and waned.    We have gone over the review of hairy cell leukemia and he was given an up-to-date article on the above.  He  understands the indications for therapy.          Past Medical History:   Diagnosis Date    HLD (hyperlipidemia)     Left inguinal hernia     Prostate cancer       .  Past Surgical History:   Procedure Laterality Date    BONE MARROW BIOPSY N/A 5/13/2025    Procedure: Biopsy-bone marrow;  Surgeon: Jasvir Andrea MD;  Location: [facility] OR;  Service: General;  Laterality: N/A;    CHOLECYSTECTOMY  2020    Dr. Jimenez    COLONOSCOPY  08/20/2019    HERNIA REPAIR  1/2025    LAPAROSCOPY      PROSTATE BIOPSY      PROSTATECTOMY      REPAIR, HERNIA, INGUINAL Left 01/09/2025    Procedure: REPAIR, HERNIA, INGUINAL  ////Open Left Inguinal Hernia Repair;  Surgeon: Tony Jimenez Jr., MD;  Location: [address] OR;  Service: General;  Laterality: Left;  Open Left Inguinal Hernia Repair    TONSILLECTOMY        .  Family History   Problem Relation Name Age of Onset    Hypertension Father Shivam     Hyperlipidemia Father Shivam     Hypertension Sister      Cancer Brother Shivam         Prostate    Hypertension Brother Shivam       Social History     Socioeconomic History    Marital status:    Tobacco Use    Smoking status: Never    Smokeless tobacco: Never   Substance and Sexual Activity    Alcohol use: Yes     Comment: Social drinker    Drug use: Never     Social Drivers of Health     Financial Resource Strain: Patient Declined (1/15/2025)    Overall Financial Resource Strain (CARDIA)     Difficulty of Paying Living Expenses: Patient declined   Food Insecurity: Patient Declined (1/15/2025)    Hunger Vital Sign     Worried About Running Out of Food in the Last Year: Patient declined     Ran Out of Food in the Last Year: Patient declined   Physical Activity: Insufficiently Active (1/15/2025)    Exercise Vital Sign     Days of Exercise per Week: 3 days     Minutes of Exercise per Session: 10 min   Stress: No Stress Concern Present (1/15/2025)    [employer] - Occupational Stress Questionnaire      Feeling of Stress : Not at all   Housing Stability: Unknown (1/15/2025)    Housing Stability Vital Sign     Unable to Pay for Housing in the Last Year: Patient declined      .Review of patient's allergies indicates:  No Known Allergies     Current Outpatient Medications:     cholecalciferol, vitamin D3, 125 mcg (5,000 unit) Tab, Take 1 tablet by mouth once daily., Disp: , Rfl:     cyanocobalamin (VITAMIN B-12) 1000 MCG tablet, Take 100 mcg by mouth once daily., Disp: , Rfl:     doxycycline (VIBRA-TABS) 100 MG tablet, Take 100 mg by mouth 2 (two) times daily., Disp: , Rfl:     folic acid/multivit-min/lutein (CENTRUM SILVER ORAL), Take by mouth., Disp: , Rfl:     predniSONE (DELTASONE) 20 MG tablet, Take 20 mg by mouth 2 (two) times daily., Disp: , Rfl:     rosuvastatin (CRESTOR) 20 MG tablet, Take 20 mg by mouth., Disp: , Rfl:    Review of Systems   Constitutional:  Negative for appetite change and unexpected weight change.   HENT:  Negative for mouth sores.    Eyes:  Negative for visual disturbance.   Respiratory:  Negative for cough and shortness of breath.    Cardiovascular:  Negative for chest pain.   Gastrointestinal:  Negative for abdominal pain and diarrhea.   Genitourinary:  Negative for frequency.   Musculoskeletal:  Negative for back pain.   Skin:  Negative for rash.   Neurological:  Negative for headaches.   Hematological:  Negative for adenopathy.   Psychiatric/Behavioral:  The patient is not nervous/anxious.         See HPI  Objective:     Vitals:    06/16/25 0842   BP: 115/75   Pulse: 68   Resp: 18   Temp: 97.9 °F (36.6 °C)        Physical Exam  Vitals reviewed.   Constitutional:       Appearance: Normal appearance.   HENT:      Head: Normocephalic and atraumatic.      Mouth/Throat:      Mouth: Mucous membranes are moist.      Pharynx: Oropharynx is clear.   Eyes:      Extraocular Movements: Extraocular movements intact.      Conjunctiva/sclera: Conjunctivae normal.      Pupils: Pupils are equal, round,  and reactive to light.   Cardiovascular:      Rate and Rhythm: Normal rate and regular rhythm.      Pulses: Normal pulses.      Heart sounds: Normal heart sounds.   Pulmonary:      Effort: Pulmonary effort is normal.      Breath sounds: Normal breath sounds.   Abdominal:      General: Abdomen is flat. Bowel sounds are normal. There is no distension.      Palpations: Abdomen is soft.   Musculoskeletal:         General: Normal range of motion.      Cervical back: Normal range of motion and neck supple.   Skin:     General: Skin is warm and dry.   Neurological:      General: No focal deficit present.      Mental Status: He is alert and oriented to person, place, and time. Mental status is at baseline.   Psychiatric:         Attention and Perception: Attention normal.         Mood and Affect: Mood normal.         Speech: Speech normal.         Behavior: Behavior normal.         Thought Content: Thought content normal.         Judgment: Judgment normal.         ECOG SCORE           Lab Review:    Assessment/Plan:   History of prostate carcinoma   Hairy cell Leukemia     This patient has had extensive workup for his thrombocytopenia including platelet antibodies which were negative, B12, folate, and methylmalonic acid. In review of peripheral blood smear, there is a question of proliferation disorder.  He had a JAK2 mutation that was negative.  His platelet count improved from 82,000 it is low up to 100,000 where it has been.  He has no sequelae related to the above.    He then had a bone marrow biopsy c/w Hairy Cell Leukemia              Hairy Cell Leukemia    Many patients with HCL are asymptomatic and can be observed for months or years after the diagnosis is established before requiring treatment. There is no clear advantage to early treatment; therapy is indicated only when the patient develops one or more of the following problems or symptoms     Significant cytopenias; typical peripheral blood counts that warrant  treatment include an absolute neutrophil count <1000/microL, hemoglobin concentration <11 g/dL, or platelet count <100,000/microL  ?Symptomatic splenomegaly (common) or symptomatic adenopathy (uncommon)  ?Constitutional symptoms (eg, fever, night sweats, fatigue, weight loss)    Most patients can be seen in clinic every three months for the first year and then every three to six months thereafter until indications for therapy arise. At these visits we perform a history, physical examination, and a complete blood count with differential.        Treatment aims to alleviate symptoms and reverse cytopenias. A formal response assessment is delayed until 4 to 6 months after completion of therapy.  ? Treatment is indicated if the patient develops significant disease-related complications at any time.    Therapy should be reserved for patients with symptomatic disease since asymptomatic patients may do well without therapy and treatment has potential short- and long-term complications     For most patients, we recommend a purine-analog-based therapy as first-line therapy    Starting four to six weeks after initial treatment with cladribine  we offer four weekly doses of  rituximab consolidation.       For patients with a previous remission lasting >=24 months, we suggest treatment with a purine analog followed by rituximab rather than a purine analog alone     For patients with an initial remission <24 months, we re-evaluate the accuracy of the original diagnosis. We do not retreat with the initial therapy. Instead, we offer one of the following:  -Off-label Vemurafenib plus rituximab    -Alternative purine analog (ie, (pentostatin  in those who have received cladribine and vice versa).         His platelet count has been stable since 2023 but waxing and waning while he has had a low of 85 he has had normal hives as well.  We discussed his continued follow up.  That he may need therapy at some point in time.  We reviewed  the indications for therapy need for continued follow-up given the fact that we have been seeing this patient has since September of 2023  I would just continue on observation.      Orders Placed This Encounter    CBC Auto Differential    Comprehensive Metabolic Panel    Lactate Dehydrogenase    Hepatitis C Antibody    Hepatitis B Surface Antigen    Hepatitis B Core Antibody, Total    HIV 1/2 Ag/Ab (4th Gen)    Uric Acid        Follow up in about 4 weeks (around 7/14/2025) for Labs cbc/cmp and OV with Sharmin.     Alberto Finney MD.

## 2025-06-13 PROBLEM — C91.40 HAIRY CELL LEUKEMIA NOT HAVING ACHIEVED REMISSION: Status: ACTIVE | Noted: 2025-06-13

## 2025-06-16 ENCOUNTER — OFFICE VISIT (OUTPATIENT)
Dept: HEMATOLOGY/ONCOLOGY | Facility: CLINIC | Age: 65
End: 2025-06-16
Payer: COMMERCIAL

## 2025-06-16 VITALS
HEART RATE: 68 BPM | TEMPERATURE: 98 F | HEIGHT: 71 IN | DIASTOLIC BLOOD PRESSURE: 75 MMHG | BODY MASS INDEX: 26.74 KG/M2 | RESPIRATION RATE: 18 BRPM | OXYGEN SATURATION: 97 % | WEIGHT: 191 LBS | SYSTOLIC BLOOD PRESSURE: 115 MMHG

## 2025-06-16 DIAGNOSIS — C91.40 HAIRY CELL LEUKEMIA NOT HAVING ACHIEVED REMISSION: ICD-10-CM

## 2025-06-16 DIAGNOSIS — D69.6 THROMBOCYTOPENIC DISORDER: Primary | ICD-10-CM

## 2025-06-16 LAB
ALBUMIN SERPL-MCNC: 3.9 G/DL (ref 3.4–4.8)
ALBUMIN/GLOB SERPL: 1.3 RATIO (ref 1.1–2)
ALP SERPL-CCNC: 78 UNIT/L (ref 40–150)
ALT SERPL-CCNC: 29 UNIT/L (ref 0–55)
ANION GAP SERPL CALC-SCNC: 5 MEQ/L
AST SERPL-CCNC: 26 UNIT/L (ref 11–45)
BASOPHILS # BLD AUTO: 0.01 X10(3)/MCL
BASOPHILS NFR BLD AUTO: 0.2 %
BILIRUB SERPL-MCNC: 0.8 MG/DL
BUN SERPL-MCNC: 16.8 MG/DL (ref 8.4–25.7)
CALCIUM SERPL-MCNC: 9.7 MG/DL (ref 8.8–10)
CHLORIDE SERPL-SCNC: 108 MMOL/L (ref 98–107)
CO2 SERPL-SCNC: 28 MMOL/L (ref 23–31)
CREAT SERPL-MCNC: 0.94 MG/DL (ref 0.72–1.25)
CREAT/UREA NIT SERPL: 18
EOSINOPHIL # BLD AUTO: 0.12 X10(3)/MCL (ref 0–0.9)
EOSINOPHIL NFR BLD AUTO: 2.2 %
ERYTHROCYTE [DISTWIDTH] IN BLOOD BY AUTOMATED COUNT: 13 % (ref 11.5–17)
GFR SERPLBLD CREATININE-BSD FMLA CKD-EPI: >60 ML/MIN/1.73/M2
GLOBULIN SER-MCNC: 3.1 GM/DL (ref 2.4–3.5)
GLUCOSE SERPL-MCNC: 104 MG/DL (ref 82–115)
HBV CORE AB SERPL QL IA: NONREACTIVE
HBV SURFACE AG SERPL QL IA: NONREACTIVE
HCT VFR BLD AUTO: 44.3 % (ref 42–52)
HCV AB SERPL QL IA: NONREACTIVE
HGB BLD-MCNC: 15 G/DL (ref 14–18)
HIV 1+2 AB+HIV1 P24 AG SERPL QL IA: NONREACTIVE
IMM GRANULOCYTES # BLD AUTO: 0.24 X10(3)/MCL (ref 0–0.04)
IMM GRANULOCYTES NFR BLD AUTO: 4.4 %
LDH SERPL-CCNC: 306 U/L (ref 125–220)
LYMPHOCYTES # BLD AUTO: 1.76 X10(3)/MCL (ref 0.6–4.6)
LYMPHOCYTES NFR BLD AUTO: 32 %
MCH RBC QN AUTO: 32.5 PG (ref 27–31)
MCHC RBC AUTO-ENTMCNC: 33.9 G/DL (ref 33–36)
MCV RBC AUTO: 95.9 FL (ref 80–94)
MONOCYTES # BLD AUTO: 1.34 X10(3)/MCL (ref 0.1–1.3)
MONOCYTES NFR BLD AUTO: 24.4 %
NEUTROPHILS # BLD AUTO: 2.03 X10(3)/MCL (ref 2.1–9.2)
NEUTROPHILS NFR BLD AUTO: 36.8 %
PLATELET # BLD AUTO: 76 X10(3)/MCL (ref 130–400)
PMV BLD AUTO: 10 FL (ref 7.4–10.4)
POTASSIUM SERPL-SCNC: 4.9 MMOL/L (ref 3.5–5.1)
PROT SERPL-MCNC: 7 GM/DL (ref 5.8–7.6)
RBC # BLD AUTO: 4.62 X10(6)/MCL (ref 4.7–6.1)
SODIUM SERPL-SCNC: 141 MMOL/L (ref 136–145)
URATE SERPL-MCNC: 3.5 MG/DL (ref 3.5–7.2)
WBC # BLD AUTO: 5.5 X10(3)/MCL (ref 4.5–11.5)

## 2025-06-16 PROCEDURE — 99214 OFFICE O/P EST MOD 30 MIN: CPT | Mod: S$GLB,,, | Performed by: INTERNAL MEDICINE

## 2025-06-16 PROCEDURE — 80053 COMPREHEN METABOLIC PANEL: CPT | Performed by: INTERNAL MEDICINE

## 2025-06-16 PROCEDURE — 3074F SYST BP LT 130 MM HG: CPT | Mod: CPTII,S$GLB,, | Performed by: INTERNAL MEDICINE

## 2025-06-16 PROCEDURE — 99999 PR PBB SHADOW E&M-EST. PATIENT-LVL IV: CPT | Mod: PBBFAC,,, | Performed by: INTERNAL MEDICINE

## 2025-06-16 PROCEDURE — 1160F RVW MEDS BY RX/DR IN RCRD: CPT | Mod: CPTII,S$GLB,, | Performed by: INTERNAL MEDICINE

## 2025-06-16 PROCEDURE — 84550 ASSAY OF BLOOD/URIC ACID: CPT | Performed by: INTERNAL MEDICINE

## 2025-06-16 PROCEDURE — 86704 HEP B CORE ANTIBODY TOTAL: CPT | Performed by: INTERNAL MEDICINE

## 2025-06-16 PROCEDURE — 1159F MED LIST DOCD IN RCRD: CPT | Mod: CPTII,S$GLB,, | Performed by: INTERNAL MEDICINE

## 2025-06-16 PROCEDURE — 85025 COMPLETE CBC W/AUTO DIFF WBC: CPT | Performed by: INTERNAL MEDICINE

## 2025-06-16 PROCEDURE — 83615 LACTATE (LD) (LDH) ENZYME: CPT | Performed by: INTERNAL MEDICINE

## 2025-06-16 PROCEDURE — 3078F DIAST BP <80 MM HG: CPT | Mod: CPTII,S$GLB,, | Performed by: INTERNAL MEDICINE

## 2025-06-16 PROCEDURE — 36415 COLL VENOUS BLD VENIPUNCTURE: CPT | Performed by: INTERNAL MEDICINE

## 2025-06-16 PROCEDURE — 87340 HEPATITIS B SURFACE AG IA: CPT | Performed by: INTERNAL MEDICINE

## 2025-06-16 PROCEDURE — 86803 HEPATITIS C AB TEST: CPT | Performed by: INTERNAL MEDICINE

## 2025-06-16 PROCEDURE — 3008F BODY MASS INDEX DOCD: CPT | Mod: CPTII,S$GLB,, | Performed by: INTERNAL MEDICINE

## 2025-06-16 PROCEDURE — 87389 HIV-1 AG W/HIV-1&-2 AB AG IA: CPT | Performed by: INTERNAL MEDICINE

## 2025-06-17 DIAGNOSIS — C91.40 HAIRY CELL LEUKEMIA NOT HAVING ACHIEVED REMISSION: Primary | ICD-10-CM

## 2025-07-10 NOTE — PROGRESS NOTES
Subjective:        PATIENT: Jose Sarmiento  MRN: [id number]  DATE: 7/15/2025  Chief Complaint: Thrombocytopenic disorder    Diagnosis:  Hairy cell leukemia    Clinical History:  Patient reports for follow up for thrombocytopenia --is seen 9/2023  He follows Urology Dr. Vergara for hx prostate carcinoma;   This patient has had extensive workup for his thrombocytopenia including platelet antibodies which were negative, B12, folate, and methylmalonic acid. In review of peripheral blood smear, there is a question of proliferation disorder. He had a JAK2 mutation that was negative. His platelet count improved from 82,000 at  is low up to 100,000 where it has been. 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      OVERALL BONE MARROW CELLULARITY OF 30%, WITH 80% INVOLVEMENT BY HAIRY CELL   LEUKEMIA, IN AN INTERSTITIAL PATTERN.      IMMUNOPHENOTYPE OF HAIRY CELL LEUKEMIA (BY FLOW CYTOMETRY AND   IMMUNOHISTOCHEMISTRY).      B CELL LINEAGE (CD19, CD20+), RESTRICTED TO KAPPA LIGHT CHAIN, WITH COEXPRESSION   OF  AND CD11C.      CYTOGENETICS: 46,XY      MPN (JAK2 V617F, CALR, MPL) REFLEX:  NEGATIVE.      FLOW CYTOMETRY: REVEALS LYMPHOCYTE POPULATION WITH ABOVE PHENOTYPE.         07/15/2025  Patient presents for follow up for his thrombocytopenia with his wife; his platelets are 78.  He is in good spirits; clean and nourished in appearance.  Denies abnormal bleeding, fever, chills or weight loss.  His only issue is lower energy level and occasional unexplained bruising.  Reviewed recent lab results in detail and discussed future appointments.    Past Medical History:    Diagnosis Date    HLD (hyperlipidemia)     Left inguinal hernia     Prostate cancer       .  Past Surgical History:   Procedure Laterality Date    BONE MARROW BIOPSY N/A 5/13/2025    Procedure: Biopsy-bone marrow;  Surgeon: Jasvir Andrea MD;  Location: [facility] OR;  Service: General;  Laterality: N/A;    CHOLECYSTECTOMY  2020    Dr. Jimenez    COLONOSCOPY  08/20/2019    HERNIA REPAIR  1/2025    LAPAROSCOPY      PROSTATE BIOPSY      PROSTATECTOMY      REPAIR, HERNIA, INGUINAL Left 01/09/2025    Procedure: REPAIR, HERNIA, INGUINAL  ////Open Left Inguinal Hernia Repair;  Surgeon: Tony Jimenez Jr., MD;  Location: [address] OR;  Service: General;  Laterality: Left;  Open Left Inguinal Hernia Repair    TONSILLECTOMY        .  Family History   Problem Relation Name Age of Onset    Hypertension Father Shivam     Hyperlipidemia Father Shivam     Hypertension Sister      Cancer Brother Shivam         Prostate    Hypertension Brother Shivam       Social History     Socioeconomic History    Marital status:    Tobacco Use    Smoking status: Never    Smokeless tobacco: Never   Substance and Sexual Activity    Alcohol use: Yes     Comment: Social drinker    Drug use: Never     Social Drivers of Health     Financial Resource Strain: Patient Declined (1/15/2025)    Overall Financial Resource Strain (CARDIA)     Difficulty of Paying Living Expenses: Patient declined   Food Insecurity: Patient Declined (1/15/2025)    Hunger Vital Sign     Worried About Running Out of Food in the Last Year: Patient declined     Ran Out of Food in the Last Year: Patient declined   Physical Activity: Insufficiently Active (1/15/2025)    Exercise Vital Sign     Days of Exercise per Week: 3 days     Minutes of Exercise per Session: 10 min   Stress: No Stress Concern Present (1/15/2025)    [employer] - Occupational Stress Questionnaire     Feeling of Stress : Not at all   Housing Stability: Unknown (1/15/2025)     Housing Stability Vital Sign     Unable to Pay for Housing in the Last Year: Patient declined      .Review of patient's allergies indicates:  No Known Allergies     Current Outpatient Medications:     cholecalciferol, vitamin D3, 125 mcg (5,000 unit) Tab, Take 1 tablet by mouth once daily., Disp: , Rfl:     cyanocobalamin (VITAMIN B-12) 1000 MCG tablet, Take 100 mcg by mouth once daily., Disp: , Rfl:     folic acid/multivit-min/lutein (CENTRUM SILVER ORAL), Take by mouth., Disp: , Rfl:     rosuvastatin (CRESTOR) 20 MG tablet, Take 20 mg by mouth., Disp: , Rfl:    Review of Systems   Constitutional:  Negative for appetite change and unexpected weight change.   HENT:  Negative for mouth sores.    Eyes:  Negative for visual disturbance.   Respiratory:  Negative for cough and shortness of breath.    Cardiovascular:  Negative for chest pain.   Gastrointestinal:  Negative for abdominal pain and diarrhea.   Genitourinary:  Negative for frequency.   Musculoskeletal:  Negative for back pain.   Skin:  Negative for rash.   Neurological:  Negative for headaches.   Hematological:  Negative for adenopathy.   Psychiatric/Behavioral:  The patient is not nervous/anxious.          Objective:     Vitals:    07/15/25 1521   BP: 111/75   Pulse: 74   Resp: 18   Temp: 98 °F (36.7 °C)      Physical Exam  Vitals reviewed.   Constitutional:       Appearance: Normal appearance.   HENT:      Head: Normocephalic and atraumatic.      Mouth/Throat:      Mouth: Mucous membranes are moist.      Pharynx: Oropharynx is clear.   Eyes:      Extraocular Movements: Extraocular movements intact.      Conjunctiva/sclera: Conjunctivae normal.      Pupils: Pupils are equal, round, and reactive to light.   Cardiovascular:      Rate and Rhythm: Normal rate and regular rhythm.      Pulses: Normal pulses.      Heart sounds: Normal heart sounds.   Pulmonary:      Effort: Pulmonary effort is normal.      Breath sounds: Normal breath sounds.   Abdominal:       General: Abdomen is flat. Bowel sounds are normal. There is no distension.      Palpations: Abdomen is soft.   Musculoskeletal:         General: Normal range of motion.      Cervical back: Normal range of motion and neck supple.   Skin:     General: Skin is warm and dry.   Neurological:      General: No focal deficit present.      Mental Status: He is alert and oriented to person, place, and time. Mental status is at baseline.   Psychiatric:         Attention and Perception: Attention normal.         Mood and Affect: Mood normal.         Speech: Speech normal.         Behavior: Behavior normal.         Thought Content: Thought content normal.         Judgment: Judgment normal.         ECOG SCORE           Lab Review:    Assessment/Plan:   History of prostate carcinoma   Hairy cell Leukemia     This patient has had extensive workup for his thrombocytopenia including platelet antibodies which were negative, B12, folate, and methylmalonic acid. In review of peripheral blood smear, there is a question of proliferation disorder.  He had a JAK2 mutation that was negative. .  He has no sequelae related to the above.    He then had a bone marrow biopsy c/w Hairy Cell Leukemia              Hairy Cell Leukemia     Many patients with HCL are asymptomatic and can be observed for months or years after the diagnosis is established before requiring treatment. There is no clear advantage to early treatment; therapy is indicated only when the patient develops one or more of the following problems or symptoms      Significant cytopenias; typical peripheral blood counts that warrant treatment include an absolute neutrophil count <1000/microL, hemoglobin concentration <11 g/dL, or platelet count <100,000/microL  ?Symptomatic splenomegaly (common) or symptomatic adenopathy (uncommon)  ?Constitutional symptoms (eg, fever, night sweats, fatigue, weight loss)     Most patients can be seen in clinic every three months for the first year and  then every three to six months thereafter until indications for therapy arise. At these visits we perform a history, physical examination, and a complete blood count with differential.          Treatment aims to alleviate symptoms and reverse cytopenias. A formal response assessment is delayed until 4 to 6 months after completion of therapy.  ? Treatment is indicated if the patient develops significant disease-related complications at any time.     Therapy should be reserved for patients with symptomatic disease since asymptomatic patients may do well without therapy and treatment has potential short- and long-term complications      For most patients, we recommend a purine-analog-based therapy as first-line therapy    Starting four to six weeks after initial treatment with cladribine  we offer four weekly doses of  rituximab consolidation.             His platelet count has been stable since 2023 but waxing and waning while he has had a low of 85 he has normalized as well.  We discussed his continued follow up.  That he may need therapy at some point in time.  We reviewed the indications for therapy need for continued follow-up given the fact that we have been seeing this patient has since September of 2023  I would just continue on observation        PLAN  Treatment: observation  If the plat are still low and fatigue worse --start therapy     Labs:  CBC, CMP      Imaging: none at present    I, Kacy Birmingham LPN, acted solely as a scribe for and in the presence of, Dr. Alberto Finney who performed the service.    Orders Placed This Encounter    Comprehensive Metabolic Panel    CBC Auto Differential      Follow up in about 6 years (around 7/15/2031) for labs cbc/cmp and OV with Sharmin.     Alberto Finney MD.

## 2025-07-15 ENCOUNTER — LAB VISIT (OUTPATIENT)
Dept: LAB | Facility: HOSPITAL | Age: 65
End: 2025-07-15
Attending: INTERNAL MEDICINE
Payer: COMMERCIAL

## 2025-07-15 ENCOUNTER — OFFICE VISIT (OUTPATIENT)
Dept: HEMATOLOGY/ONCOLOGY | Facility: CLINIC | Age: 65
End: 2025-07-15
Payer: COMMERCIAL

## 2025-07-15 VITALS
HEART RATE: 74 BPM | DIASTOLIC BLOOD PRESSURE: 75 MMHG | BODY MASS INDEX: 26.69 KG/M2 | TEMPERATURE: 98 F | SYSTOLIC BLOOD PRESSURE: 111 MMHG | OXYGEN SATURATION: 97 % | RESPIRATION RATE: 18 BRPM | HEIGHT: 71 IN | WEIGHT: 190.63 LBS

## 2025-07-15 DIAGNOSIS — C91.40 HAIRY CELL LEUKEMIA NOT HAVING ACHIEVED REMISSION: ICD-10-CM

## 2025-07-15 DIAGNOSIS — D69.6 THROMBOCYTOPENIC DISORDER: ICD-10-CM

## 2025-07-15 DIAGNOSIS — C91.40 HAIRY CELL LEUKEMIA NOT HAVING ACHIEVED REMISSION: Primary | ICD-10-CM

## 2025-07-15 LAB
ALBUMIN SERPL-MCNC: 4 G/DL (ref 3.4–4.8)
ALBUMIN/GLOB SERPL: 1.4 RATIO (ref 1.1–2)
ALP SERPL-CCNC: 74 UNIT/L (ref 40–150)
ALT SERPL-CCNC: 33 UNIT/L (ref 0–55)
ANION GAP SERPL CALC-SCNC: 5 MEQ/L
AST SERPL-CCNC: 25 UNIT/L (ref 11–45)
BASOPHILS # BLD AUTO: 0.01 X10(3)/MCL
BASOPHILS NFR BLD AUTO: 0.2 %
BILIRUB SERPL-MCNC: 1 MG/DL
BUN SERPL-MCNC: 14.9 MG/DL (ref 8.4–25.7)
CALCIUM SERPL-MCNC: 9.4 MG/DL (ref 8.8–10)
CHLORIDE SERPL-SCNC: 107 MMOL/L (ref 98–107)
CO2 SERPL-SCNC: 27 MMOL/L (ref 23–31)
CREAT SERPL-MCNC: 0.94 MG/DL (ref 0.72–1.25)
CREAT/UREA NIT SERPL: 16
EOSINOPHIL # BLD AUTO: 0.12 X10(3)/MCL (ref 0–0.9)
EOSINOPHIL NFR BLD AUTO: 1.9 %
ERYTHROCYTE [DISTWIDTH] IN BLOOD BY AUTOMATED COUNT: 13.1 % (ref 11.5–17)
GFR SERPLBLD CREATININE-BSD FMLA CKD-EPI: >60 ML/MIN/1.73/M2
GLOBULIN SER-MCNC: 2.8 GM/DL (ref 2.4–3.5)
GLUCOSE SERPL-MCNC: 94 MG/DL (ref 82–115)
HCT VFR BLD AUTO: 44.6 % (ref 42–52)
HGB BLD-MCNC: 15.1 G/DL (ref 14–18)
IMM GRANULOCYTES # BLD AUTO: 0.13 X10(3)/MCL (ref 0–0.04)
IMM GRANULOCYTES NFR BLD AUTO: 2.1 %
LYMPHOCYTES # BLD AUTO: 2.29 X10(3)/MCL (ref 0.6–4.6)
LYMPHOCYTES NFR BLD AUTO: 36.5 %
MCH RBC QN AUTO: 32.5 PG (ref 27–31)
MCHC RBC AUTO-ENTMCNC: 33.9 G/DL (ref 33–36)
MCV RBC AUTO: 96.1 FL (ref 80–94)
MONOCYTES # BLD AUTO: 1.55 X10(3)/MCL (ref 0.1–1.3)
MONOCYTES NFR BLD AUTO: 24.7 %
NEUTROPHILS # BLD AUTO: 2.17 X10(3)/MCL (ref 2.1–9.2)
NEUTROPHILS NFR BLD AUTO: 34.6 %
PLATELET # BLD AUTO: 78 X10(3)/MCL (ref 130–400)
PMV BLD AUTO: 9.8 FL (ref 7.4–10.4)
POTASSIUM SERPL-SCNC: 5 MMOL/L (ref 3.5–5.1)
PROT SERPL-MCNC: 6.8 GM/DL (ref 5.8–7.6)
RBC # BLD AUTO: 4.64 X10(6)/MCL (ref 4.7–6.1)
SODIUM SERPL-SCNC: 139 MMOL/L (ref 136–145)
WBC # BLD AUTO: 6.27 X10(3)/MCL (ref 4.5–11.5)

## 2025-07-15 PROCEDURE — 99999 PR PBB SHADOW E&M-EST. PATIENT-LVL IV: CPT | Mod: PBBFAC,,, | Performed by: INTERNAL MEDICINE

## 2025-07-15 PROCEDURE — 3074F SYST BP LT 130 MM HG: CPT | Mod: CPTII,S$GLB,, | Performed by: INTERNAL MEDICINE

## 2025-07-15 PROCEDURE — 3078F DIAST BP <80 MM HG: CPT | Mod: CPTII,S$GLB,, | Performed by: INTERNAL MEDICINE

## 2025-07-15 PROCEDURE — 1159F MED LIST DOCD IN RCRD: CPT | Mod: CPTII,S$GLB,, | Performed by: INTERNAL MEDICINE

## 2025-07-15 PROCEDURE — 85025 COMPLETE CBC W/AUTO DIFF WBC: CPT

## 2025-07-15 PROCEDURE — 80053 COMPREHEN METABOLIC PANEL: CPT

## 2025-07-15 PROCEDURE — 99214 OFFICE O/P EST MOD 30 MIN: CPT | Mod: S$GLB,,, | Performed by: INTERNAL MEDICINE

## 2025-07-15 PROCEDURE — 1160F RVW MEDS BY RX/DR IN RCRD: CPT | Mod: CPTII,S$GLB,, | Performed by: INTERNAL MEDICINE

## 2025-07-15 PROCEDURE — 3008F BODY MASS INDEX DOCD: CPT | Mod: CPTII,S$GLB,, | Performed by: INTERNAL MEDICINE

## 2025-08-28 ENCOUNTER — OFFICE VISIT (OUTPATIENT)
Dept: HEMATOLOGY/ONCOLOGY | Facility: CLINIC | Age: 65
End: 2025-08-28
Payer: COMMERCIAL

## 2025-08-28 ENCOUNTER — LAB VISIT (OUTPATIENT)
Dept: LAB | Facility: HOSPITAL | Age: 65
End: 2025-08-28
Attending: INTERNAL MEDICINE
Payer: COMMERCIAL

## 2025-08-28 VITALS
TEMPERATURE: 98 F | HEIGHT: 71 IN | WEIGHT: 190.69 LBS | SYSTOLIC BLOOD PRESSURE: 114 MMHG | BODY MASS INDEX: 26.7 KG/M2 | HEART RATE: 72 BPM | OXYGEN SATURATION: 95 % | RESPIRATION RATE: 18 BRPM | DIASTOLIC BLOOD PRESSURE: 73 MMHG

## 2025-08-28 DIAGNOSIS — C91.40 HAIRY CELL LEUKEMIA NOT HAVING ACHIEVED REMISSION: Primary | ICD-10-CM

## 2025-08-28 DIAGNOSIS — C91.40 HAIRY CELL LEUKEMIA NOT HAVING ACHIEVED REMISSION: ICD-10-CM

## 2025-08-28 DIAGNOSIS — D69.6 THROMBOCYTOPENIC DISORDER: ICD-10-CM

## 2025-08-28 LAB
ALBUMIN SERPL-MCNC: 4 G/DL (ref 3.4–4.8)
ALBUMIN/GLOB SERPL: 1.2 RATIO (ref 1.1–2)
ALP SERPL-CCNC: 82 UNIT/L (ref 40–150)
ALT SERPL-CCNC: 28 UNIT/L (ref 0–55)
ANION GAP SERPL CALC-SCNC: 5 MEQ/L
AST SERPL-CCNC: 23 UNIT/L (ref 11–45)
BASOPHILS # BLD AUTO: 0.01 X10(3)/MCL
BASOPHILS NFR BLD AUTO: 0.2 %
BILIRUB SERPL-MCNC: 0.6 MG/DL
BUN SERPL-MCNC: 16.2 MG/DL (ref 8.4–25.7)
CALCIUM SERPL-MCNC: 9.2 MG/DL (ref 8.8–10)
CHLORIDE SERPL-SCNC: 107 MMOL/L (ref 98–107)
CO2 SERPL-SCNC: 29 MMOL/L (ref 23–31)
CREAT SERPL-MCNC: 0.84 MG/DL (ref 0.72–1.25)
CREAT/UREA NIT SERPL: 19
EOSINOPHIL # BLD AUTO: 0.13 X10(3)/MCL (ref 0–0.9)
EOSINOPHIL NFR BLD AUTO: 2 %
ERYTHROCYTE [DISTWIDTH] IN BLOOD BY AUTOMATED COUNT: 13 % (ref 11.5–17)
GFR SERPLBLD CREATININE-BSD FMLA CKD-EPI: >60 ML/MIN/1.73/M2
GLOBULIN SER-MCNC: 3.3 GM/DL (ref 2.4–3.5)
GLUCOSE SERPL-MCNC: 100 MG/DL (ref 82–115)
HCT VFR BLD AUTO: 46.1 % (ref 42–52)
HGB BLD-MCNC: 15.6 G/DL (ref 14–18)
IMM GRANULOCYTES # BLD AUTO: 0.22 X10(3)/MCL (ref 0–0.04)
IMM GRANULOCYTES NFR BLD AUTO: 3.4 %
LYMPHOCYTES # BLD AUTO: 2.49 X10(3)/MCL (ref 0.6–4.6)
LYMPHOCYTES NFR BLD AUTO: 38.4 %
MCH RBC QN AUTO: 32.5 PG (ref 27–31)
MCHC RBC AUTO-ENTMCNC: 33.8 G/DL (ref 33–36)
MCV RBC AUTO: 96 FL (ref 80–94)
MONOCYTES # BLD AUTO: 1.49 X10(3)/MCL (ref 0.1–1.3)
MONOCYTES NFR BLD AUTO: 23 %
NEUTROPHILS # BLD AUTO: 2.14 X10(3)/MCL (ref 2.1–9.2)
NEUTROPHILS NFR BLD AUTO: 33 %
PLATELET # BLD AUTO: 75 X10(3)/MCL (ref 130–400)
PMV BLD AUTO: 9.2 FL (ref 7.4–10.4)
POTASSIUM SERPL-SCNC: 4.2 MMOL/L (ref 3.5–5.1)
PROT SERPL-MCNC: 7.3 GM/DL (ref 5.8–7.6)
RBC # BLD AUTO: 4.8 X10(6)/MCL (ref 4.7–6.1)
SODIUM SERPL-SCNC: 141 MMOL/L (ref 136–145)
WBC # BLD AUTO: 6.48 X10(3)/MCL (ref 4.5–11.5)

## 2025-08-28 PROCEDURE — 1160F RVW MEDS BY RX/DR IN RCRD: CPT | Mod: CPTII,S$GLB,, | Performed by: INTERNAL MEDICINE

## 2025-08-28 PROCEDURE — 99214 OFFICE O/P EST MOD 30 MIN: CPT | Mod: S$GLB,,, | Performed by: INTERNAL MEDICINE

## 2025-08-28 PROCEDURE — 99999 PR PBB SHADOW E&M-EST. PATIENT-LVL V: CPT | Mod: PBBFAC,,, | Performed by: INTERNAL MEDICINE

## 2025-08-28 PROCEDURE — 3074F SYST BP LT 130 MM HG: CPT | Mod: CPTII,S$GLB,, | Performed by: INTERNAL MEDICINE

## 2025-08-28 PROCEDURE — 3078F DIAST BP <80 MM HG: CPT | Mod: CPTII,S$GLB,, | Performed by: INTERNAL MEDICINE

## 2025-08-28 PROCEDURE — 3008F BODY MASS INDEX DOCD: CPT | Mod: CPTII,S$GLB,, | Performed by: INTERNAL MEDICINE

## 2025-08-28 PROCEDURE — 80053 COMPREHEN METABOLIC PANEL: CPT

## 2025-08-28 PROCEDURE — 1159F MED LIST DOCD IN RCRD: CPT | Mod: CPTII,S$GLB,, | Performed by: INTERNAL MEDICINE

## 2025-08-28 PROCEDURE — 85025 COMPLETE CBC W/AUTO DIFF WBC: CPT

## 2025-08-28 RX ORDER — PROCHLORPERAZINE MALEATE 5 MG
10 TABLET ORAL EVERY 6 HOURS PRN
Qty: 20 TABLET | Refills: 11 | Status: SHIPPED | OUTPATIENT
Start: 2025-09-07

## 2025-08-28 RX ORDER — ACYCLOVIR 400 MG/1
400 TABLET ORAL 2 TIMES DAILY
Qty: 60 TABLET | Refills: 3 | Status: SHIPPED | OUTPATIENT
Start: 2025-08-28 | End: 2025-12-26

## 2025-08-28 RX ORDER — SULFAMETHOXAZOLE AND TRIMETHOPRIM 800; 160 MG/1; MG/1
TABLET ORAL
Qty: 12 TABLET | Refills: 3 | Status: SHIPPED | OUTPATIENT
Start: 2025-08-28

## 2025-09-02 ENCOUNTER — OFFICE VISIT (OUTPATIENT)
Dept: HEMATOLOGY/ONCOLOGY | Facility: CLINIC | Age: 65
End: 2025-09-02
Payer: COMMERCIAL

## 2025-09-02 ENCOUNTER — CLINICAL SUPPORT (OUTPATIENT)
Dept: HEMATOLOGY/ONCOLOGY | Facility: CLINIC | Age: 65
End: 2025-09-02
Payer: COMMERCIAL

## 2025-09-02 VITALS
SYSTOLIC BLOOD PRESSURE: 113 MMHG | HEIGHT: 71 IN | WEIGHT: 190.31 LBS | HEART RATE: 73 BPM | OXYGEN SATURATION: 96 % | RESPIRATION RATE: 18 BRPM | TEMPERATURE: 98 F | BODY MASS INDEX: 26.64 KG/M2 | DIASTOLIC BLOOD PRESSURE: 73 MMHG

## 2025-09-02 DIAGNOSIS — C91.40 HAIRY CELL LEUKEMIA NOT HAVING ACHIEVED REMISSION: Primary | ICD-10-CM

## 2025-09-02 PROCEDURE — 3074F SYST BP LT 130 MM HG: CPT | Mod: CPTII,S$GLB,,

## 2025-09-02 PROCEDURE — 3078F DIAST BP <80 MM HG: CPT | Mod: CPTII,S$GLB,,

## 2025-09-02 PROCEDURE — 99999 PR PBB SHADOW E&M-EST. PATIENT-LVL IV: CPT | Mod: PBBFAC,,,

## 2025-09-02 PROCEDURE — 1159F MED LIST DOCD IN RCRD: CPT | Mod: CPTII,S$GLB,,

## 2025-09-02 PROCEDURE — 99999 PR PBB SHADOW E&M-EST. PATIENT-LVL I: CPT | Mod: PBBFAC,,,

## 2025-09-02 PROCEDURE — 99215 OFFICE O/P EST HI 40 MIN: CPT | Mod: S$GLB,,,

## 2025-09-02 PROCEDURE — 3008F BODY MASS INDEX DOCD: CPT | Mod: CPTII,S$GLB,,

## 2025-09-05 RX ORDER — PROCHLORPERAZINE EDISYLATE 5 MG/ML
10 INJECTION INTRAMUSCULAR; INTRAVENOUS ONCE AS NEEDED
OUTPATIENT
Start: 2025-09-05

## 2025-09-05 RX ORDER — HEPARIN 100 UNIT/ML
500 SYRINGE INTRAVENOUS
OUTPATIENT
Start: 2025-09-05

## 2025-09-05 RX ORDER — SODIUM CHLORIDE 0.9 % (FLUSH) 0.9 %
10 SYRINGE (ML) INJECTION
OUTPATIENT
Start: 2025-09-05

## 2025-09-05 RX ORDER — EPINEPHRINE 0.3 MG/.3ML
0.3 INJECTION SUBCUTANEOUS ONCE AS NEEDED
OUTPATIENT
Start: 2025-09-05 | End: 2037-01-31

## 2025-09-05 RX ORDER — FAMOTIDINE 10 MG/ML
20 INJECTION, SOLUTION INTRAVENOUS
OUTPATIENT
Start: 2025-09-05 | End: 2025-09-05

## 2025-09-05 RX ORDER — ACETAMINOPHEN 325 MG/1
650 TABLET ORAL
OUTPATIENT
Start: 2025-09-05 | End: 2025-09-05

## 2025-09-05 RX ORDER — DIPHENHYDRAMINE HYDROCHLORIDE 50 MG/ML
50 INJECTION, SOLUTION INTRAMUSCULAR; INTRAVENOUS ONCE AS NEEDED
OUTPATIENT
Start: 2025-09-05 | End: 2037-01-31

## 2025-09-05 RX ORDER — MEPERIDINE HYDROCHLORIDE 50 MG/ML
25 INJECTION INTRAMUSCULAR; INTRAVENOUS; SUBCUTANEOUS
OUTPATIENT
Start: 2025-09-05

## (undated) DEVICE — SUT VICRYL PLUS 4-0 FS-2 27IN

## (undated) DEVICE — NDL HYPO REG 25G X 1 1/2

## (undated) DEVICE — GLOVE SIGNATURE MICRO LTX 7.5

## (undated) DEVICE — DRAIN JP STD PENROSE 1/4X12IN

## (undated) DEVICE — NDL SYR 10ML 18X1.5 LL BLUNT

## (undated) DEVICE — SUPPORT ULNA NERVE PROTECTOR

## (undated) DEVICE — SUT 2/0 30IN PROLENE MONO

## (undated) DEVICE — SEE MEDLINE ITEM 154981

## (undated) DEVICE — Device

## (undated) DEVICE — SUT CTD VICRYL 2.0

## (undated) DEVICE — PENCIL ELECSURG ROCKER 15FT

## (undated) DEVICE — CLOSURE SKIN STERI STRIP 1/2X4

## (undated) DEVICE — BENZOIN TINCTURE 0.66ML

## (undated) DEVICE — SOL NACL IRR 1000ML BTL

## (undated) DEVICE — SUT VICRYL 3-0 27 SH

## (undated) DEVICE — DRESSING TRANS 4X4 TEGADERM

## (undated) DEVICE — ELECTRODE REM PLYHSV RETURN 9

## (undated) DEVICE — GLOVE SIGNATURE MICRO LTX 6.5

## (undated) DEVICE — GLOVE SENSICARE PI MICRO 7

## (undated) DEVICE — GOWN POLY REINF BRTH SLV XL

## (undated) DEVICE — SUT 2/0 30IN SILK BLK BRAI